# Patient Record
Sex: MALE | Race: WHITE
[De-identification: names, ages, dates, MRNs, and addresses within clinical notes are randomized per-mention and may not be internally consistent; named-entity substitution may affect disease eponyms.]

---

## 2017-01-21 ENCOUNTER — HOSPITAL ENCOUNTER (OUTPATIENT)
Dept: HOSPITAL 80 - FED | Age: 37
Setting detail: OBSERVATION
Discharge: HOME | End: 2017-01-21
Attending: INTERNAL MEDICINE | Admitting: INTERNAL MEDICINE
Payer: COMMERCIAL

## 2017-01-21 VITALS — OXYGEN SATURATION: 99 % | TEMPERATURE: 97.6 F | HEART RATE: 88 BPM

## 2017-01-21 VITALS — DIASTOLIC BLOOD PRESSURE: 99 MMHG | SYSTOLIC BLOOD PRESSURE: 130 MMHG | RESPIRATION RATE: 18 BRPM

## 2017-01-21 DIAGNOSIS — S00.572A: ICD-10-CM

## 2017-01-21 DIAGNOSIS — F31.9: ICD-10-CM
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
## 2017-01-21 DIAGNOSIS — G40.909: Primary | ICD-10-CM

## 2017-01-21 LAB
% IMMATURE GRANULYOCYTES: 0.2 % (ref 0–1.1)
ABSOLUTE IMMATURE GRANULOCYTES: 0.03 10^3/UL (ref 0–0.1)
ABSOLUTE NRBC COUNT: 0 10^3/UL (ref 0–0.01)
ADD DIFF?: NO
ADD MORPH?: NO
ADD SCAN?: NO
ANION GAP SERPL CALC-SCNC: 28 MEQ/L (ref 8–16)
ATYPICAL LYMPHOCYTE FLAG: 0 (ref 0–99)
CALCIUM SERPL-MCNC: 9.6 MG/DL (ref 8.5–10.4)
CHLORIDE SERPL-SCNC: 100 MEQ/L (ref 97–110)
CO2 SERPL-SCNC: 16 MEQ/L (ref 22–31)
CREAT SERPL-MCNC: 1.1 MG/DL (ref 0.7–1.3)
ERYTHROCYTE [DISTWIDTH] IN BLOOD BY AUTOMATED COUNT: 12.5 % (ref 11.5–15.2)
FRAGMENT RBC FLAG: 0 (ref 0–99)
GFR SERPL CREATININE-BSD FRML MDRD: > 60 ML/MIN/{1.73_M2}
GLUCOSE SERPL-MCNC: 201 MG/DL (ref 70–100)
HCT VFR BLD CALC: 42.3 % (ref 40–51)
HGB BLD-MCNC: 14.2 G/DL (ref 13.7–17.5)
LEFT SHIFT FLG: 0 (ref 0–99)
LIPEMIA HEMOLYSIS FLAG: 80 (ref 0–99)
MCH RBC BLDCO QN: 31.7 PG (ref 27.9–34.1)
MCHC RBC AUTO-ENTMCNC: 33.6 G/DL (ref 32.4–36.7)
MCV RBC AUTO: 94.4 FL (ref 81.5–99.8)
NRBC-AUTO%: 0 % (ref 0–0.2)
PLATELET # BLD: 346 10^3/UL (ref 150–400)
PLATELET CLUMPS FLAG: 10 (ref 0–99)
PMV BLD AUTO: 10.1 FL (ref 8.7–11.7)
POTASSIUM SERPL-SCNC: 3.5 MEQ/L (ref 3.5–5.2)
RBC # BLD AUTO: 4.48 10^6/UL (ref 4.4–6.38)
SODIUM SERPL-SCNC: 144 MEQ/L (ref 134–144)

## 2017-01-21 PROCEDURE — 99291 CRITICAL CARE FIRST HOUR: CPT

## 2017-01-21 PROCEDURE — 96375 TX/PRO/DX INJ NEW DRUG ADDON: CPT

## 2017-01-21 PROCEDURE — 70450 CT HEAD/BRAIN W/O DYE: CPT

## 2017-01-21 PROCEDURE — 70486 CT MAXILLOFACIAL W/O DYE: CPT

## 2017-01-21 PROCEDURE — 96374 THER/PROPH/DIAG INJ IV PUSH: CPT

## 2017-01-21 PROCEDURE — G0378 HOSPITAL OBSERVATION PER HR: HCPCS

## 2017-01-21 PROCEDURE — 93005 ELECTROCARDIOGRAM TRACING: CPT

## 2017-01-21 PROCEDURE — 96361 HYDRATE IV INFUSION ADD-ON: CPT

## 2017-01-21 NOTE — GHP
[f rep st]



                                                            HISTORY AND PHYSICAL





DATE OF ADMISSION:  01/21/2017



CHIEF COMPLAINT:  Seizure.



HISTORY OF PRESENT ILLNESS:  A 36-year-old man brought into the ED after seizure.  He is quite obtund
ed when I am seeing him.  History is obtained per chart and per discussion with Dr. Grossman.  He has 
a known seizure disorder, takes Lamictal.  He did not take his Lamictal and drank alcohol yesterday. 
 He then had a seizure.  He would return back to his baseline, per reports, while he was in the emerg
ency department.  At that time, he did not have any facial pain, head, neck or spinal pain.  Initial 
plan was to discharge him. 



He was then witnessed to have 2 additional seizures while he was in the emergency department.  After 
these seizures, he became quite combative in a postictal state.  He required 4 mg of IV Ativan to con
trol him.  He also required ketamine.  He was so combative to have CT scans performed.  When I am see
ing him, he is quite sleepy.  He will arouse to sternal rub but is not very coherent.



PAST MEDICAL HISTORY:  

1.  Bipolar.

2.  Seizure.

3.  Anxiety disorder.

4.  Vertebral fracture.



PAST SURGICAL HISTORY:  None.



SOCIAL HISTORY:  He occasionally drinks alcohol.  He initially denied drug use.



FAMILY HISTORY:  Unknown, due to patient's state.



REVIEW OF SYSTEMS:  Unobtainable, due to patient's state.



PHYSICAL EXAM:  VITAL SIGNS:  Blood pressure is 139/109, heart rate 128, respiration rate 20, saturat
ing 94% on 2 L, temperature is 36.8.  GENERAL:  The patient is an obtunded man lying in bed, with blo
od throughout his mouth.  HEENT:  Shows blood on his mouth.  Unable to fully inspect his mouth at thi
s time.  CARDIOVASCULAR:  Exam shows him to be tachycardic.  There are no murmurs rubs or gallops.  P
ULMONARY:  Exam shows him to be protecting his airway.  His lungs are clear to auscultation bilateral
ly.  ABDOMEN:  Soft in all 4 quadrants.  He is nontender.  He does not grimace with deep palpation.  
There are no masses or hepatomegaly appreciated. 

SKIN:  Exam shows no rash. 

:  Exam shows no Rodriguez. 

NEUROLOGIC:  Exam shows him to be obtunded.  He is moving all of his extremities. 

PSYCHIATRIC:  Exam was unobtainable.



LABS:  White count is 12.8, bicarbonate is 16, glucose is 201.  Lamictal level is pending.



DATA:  

1.  I discussed this with Dr. Grossman.  We will admit him to the hospital.

2.  Face CT shows no acute findings.

3.  Head CT shows no acute intracranial findings.

4.  ECG, which I personally viewed and interpreted, shows sinus tachycardia.  He has mild T-wave abno
rmalities diffusely.



IMPRESSION AND PLAN:  This is a 36-year-old man who presented with multiple seizures/status epileptic
us.

1.  Multiple seizures/status epilepticus:  Currently quite obtunded, after receiving Ativan and Keppr
a.  I think it would be safest to admit him to the ICU for close monitoring.  He was quite combative 
after seizures in the emergency department.  We will give him, for now, 500 mg of IV Keppra.  I have 
written for p.r.n. Ativan.  He may need a Precedex drip to control him, based on his response when th
e current Ativan wears off.  I have not been able to adequately look in his mouth to see if potential
 tongue or cheek lacerations; this will need to be done, when he is more alert, as he has significant
 amount of blood dried blood around his mouth.  He takes Lamictal as an outpatient.  Will need to res
tart this when appropriate.

2.  Bipolar disorder:  Taking Lamictal.

3.  Lamictal level is pending.





Job #:  [id number]

## 2017-01-21 NOTE — GDS
[f rep st]



                                                             DISCHARGE SUMMARY





ALL DIAGNOSES:  

1.  Multiple seizures. 

2.  Bipolar disorder. 

3.  Anxiety disorder.



HOSPITAL COURSE:  A 36-year-old man who was brought in after having a seizure.  He was initially conv
ersant in the Emergency Department.  He stated that he had not taken his Lamictal.  He then proceeded
 to have another seizure, became postictal and quite agitated, required IV Ativan.  When he was admit
amy he was quite obtunded.  After a few hours, he has completely cleared.  He is ambulating safely, e
ating and drinking.  His mother is with him and says that she will stay with him tonight.  I warned t
hem that it would be safer to watch him here, considering he had multiple seizures in 1 day.  However
, they understand the risks and benefits of being discharged, and I think it is reasonable to dischar
ge him in the situation.



MEDICATIONS:  He has all the prescriptions he needs.



FOLLOWUP:  He has good follow up with Neurology, as well as Psychiatry for his bipolar.





Job #:  [id number]

## 2017-01-21 NOTE — CT
CT Head Without Contrast



History: Seizure this morning and again in the Emergency Department, bleeding from mouth.



Comparison: CT facial bones same day, CT head October 28, 2016.



Technique: Axial unenhanced images were obtained from the vertex through the skull base. Dose reducti
on techniques were utilized.



Findings: Gray-white differentiation is preserved.  The ventricles and sulci are normal.  No intracra
nial hemorrhage is identified.  No extraaxial fluid collections are identified. There is no mass effe
ct or evidence of infarct.  The skull and skull base are unremarkable.  Mild mucous membrane thickeni
ng is present in the paranasal sinuses.  The mastoid air cells are clear. 



Impression: No acute intracranial findings.



Findings discussed with Dr. Mónica Gorssman today at 1453 hours.

## 2017-01-21 NOTE — EDPHY
H & P


Time Seen by Provider: 01/21/17 12:18


HPI/ROS: 


CHIEF COMPLAINT:   seizure, facial trauma





HISTORY OF PRESENT ILLNESS:  Patient is a 36-year-old male with a history of 

bipolar disorder and known seizure disorder.  The patient states he normally 

takes Lamictal.  I had the perfect storm.  The patient states he drank 

alcohol yesterday and he did not take his Lamictal.  This caused him to be 

prone to seizures.  He had seizure activity this morning.  His last seizure was 

1 year ago.  He does not recall the event.  He now feels back to his baseline.  

He is aware he is in the emergency department answers all my questions 

appropriately.  He states he does not want further testing at this time.  He 

denies significant facial pain.  He denies any headache.  He has no neck or 

spinal pain.  Denies weakness or numbness.  States his dentition is well 

aligned and he has a normal bite.





REVIEW OF SYSTEMS:  


My complete review of systems is negative except as mentioned in the HPI.


Past Medical/Surgical History: 


Includes bipolar disorder, seizure, anxiety, thoracic vertebral fracture





Past surgical history:  Negative





Social history:  The patient drank last evening.  He denies drug use.


Smoking Status: Never smoked


Physical Exam: 


Vitals noted.  The patient is tachycardic.


GENERAL:  Well-appearing, in no acute distress, alert.


HEAD:  No evidence of trauma.


EYES:  PERRLA, EOMI, normal to inspection.


Face/ENT:  Airway intact.  Patient has a small nonsuturable laceration on his 

right lateral tongue.  Dentition is intact. No malocclusion, No hemotympanum, 

normal external examination.


NECK:  The trachea is midline.  There is no crepitus.  The C-spine is 

nontender.  NEXUS criteria is negative (no midline tenderness, no distracting 

injury, no altered mental status, no recent alcohol use, no focal neurologic 

deficit).


RESPIRATORY:  Clear to auscultation bilaterally, no rales, rhonchi or wheezing.

  There is no crepitus or palpable rib fractures.


CVS:  Regular rhythm, tachycardia, no rubs, murmurs, or gallops.


ABDOMEN:  Soft, nontender, nondistended, normal bowel sounds, no bruising or 

abrasions.


Pelvis:  Stable. No tenderness palpation.  Hips full range of motion.


BACK:  Normal to inspection, no spinal tenderness, no spinal step off, no 

notable bruising or abrasions.


SKIN:  Normal color, warm, dry.  No pallor or diaphoresis.


EXTREMITIES:  


Right upper extremity:  Atraumatic.  No visible signs of trauma. No tenderness 

palpation. Neurovascular intact distally.


Left upper extremity:  Patient has some abrasions to his left hand.  He has no 

bony tenderness palpation with full range of motion. Otherwise no visible 

trauma. Neurovascular intact distally.


Right lower extremity:  Atraumatic.  No visible signs of trauma. No tenderness 

palpation. Neurovascular intact distally.


Left lower extremity:  Atraumatic.  No visible signs of trauma. No tenderness 

palpation. Neurovascular intact distally.


Atraumatic, neurovascularly intact distally in all extremities, pelvis is stable

, hips with full range of motion, moves all extremities freely.


NEURO/PSYCH:  


Higher functions:  Alert and Oriented x3.  Normal speech and cognition.  Normal 

mood and affect.


Cranial nerves:  Normal as tested.


Cerebellar:  Normal as tested.  Good finger to nose, good heel-to-shin, normal 

gait.


Peripheral exam:  Normal motor exam.  Normal sensation.  


Constitutional: 


 Initial Vital Signs











Temperature (C)  36.8 C   01/21/17 12:51


 


Heart Rate  128 H  01/21/17 12:51


 


Respiratory Rate  20   01/21/17 12:51


 


Blood Pressure  139/109 H  01/21/17 12:51


 


O2 Sat (%)  94   01/21/17 12:51








 











O2 Delivery Mode               Nasal Cannula


 


O2 (L/minute)                  2














Allergies/Adverse Reactions: 


 





No Known Allergies Allergy (Unverified 10/20/16 03:12)


 








Home Medications: 














 Medication  Instructions  Recorded


 


Hydrocodone/APAP 5/325 [Norco 1 - 2 tab PO Q4H PRN #10 tab 10/20/16





5/325]  


 


Ibuprofen [Motrin (*)] 800 mg PO Q6-8PRN #30 tab 10/20/16


 


LAMOTRIGINE  10/20/16


 


Seroquel  10/20/16














Medical Decision Making


ED Course/Re-evaluation: 


I discussed possible etiologies with the patient.  He does not want further 

testing at this time.  I discussed the benefit of imaging of the head and face 

for possible trauma and as a source of his seizure.  He states he has a known 

seizure disorder that comes about when he drinks and does not take his 

Lamictal.  He does not want further imaging or blood tests.  The patient was 

given normal saline 1 L IV for hydration due to his tachycardia.  Patient 

consented.





155:  I was walking by the patient room.  He appeared to have the beginning of 

seizure-like activity.  I went to his bedside.  Suction and non-rebreather was 

placed.  It I maintained his airway during his seizure activity.  Nursing staff 

was notified.  The patient was given Ativan 1 mg IV.  The patient's seizure 

arrested.  He did become tachycardic and hypoxic during his seizure activity.  

Once seizure activity was resolved with the non-rebreather in place his oxygen 

saturation was 99% on room air.





Due to the patient's repeat seizure activity laboratory studies and CT scan was 

ordered.





I was called back to the patient's bedside due to combative behavior.  Nursing 

staff states that given him Ativan 3 mg additionally prior to my arrival.  The 

patient was requiring significant restraint.  I ordered ketamine 1 milligram/

kilogram IV.





I stayed with the patient and rechecked the bedside.  Ketamine appeared to work 

well and he had associated.  Delayed comfortably in the bed.  He was taken to 

CT imaging without complication.





Upon return to the room the patient was stable.  He had no further seizure 

activity.  Patient was able to open his eyes to loud voice.  He moved all 

extremities with no signs of focal deficit.  He responded to an IV placement in 

his right upper extremity.





I discussed case with the hospitalist service for admission. (Dr. Vaughn)





1455:  CT of the head and face.  No acute disease noted. Please refer the 

dictated report by Dr. Shook.





1500:  The patient is stable. No new complaints.  I discussed the plan with the 

patient's mother.  Discussed CT results.





1545:  The patient is stable. He is still sleepy but arouses to voice.  Moves 

all extremities. No seizure-like activity.


Differential Diagnosis: 


My differential includes but is not limited to seizure disorder, seizure 

activity, facial trauma, dental trauma, electrolyte abnormality, sugar 

abnormality, dehydration, subarachnoid hemorrhage, subdural hematoma, epidural 

hematoma, spinal injury


Critical Care Time: 


Patient required 40 minutes of critical care time.  This is exclusive of any 

unbundled procedure.  This was due to the patient's seizure activity in the 

emergency department, extensive time spent at the patient's bedside, multiple 

doses of medication including ketamine, and frequent rechecks.





- Data Points


Laboratory Results: 


 Laboratory Results





 01/21/17 12:25 





 01/21/17 12:25 





 











  01/21/17





  12:25


 


WBC  12.18 H 10^3/uL





   (3.80-9.50) 


 


RBC  4.48 10^6/uL





   (4.40-6.38) 


 


Hgb  14.2 g/dL





   (13.7-17.5) 


 


Hct  42.3 %





   (40.0-51.0) 


 


MCV  94.4 fL





   (81.5-99.8) 


 


MCH  31.7 pg





   (27.9-34.1) 


 


MCHC  33.6 g/dL





   (32.4-36.7) 


 


RDW  12.5 %





   (11.5-15.2) 


 


Plt Count  346 10^3/uL





   (150-400) 


 


MPV  10.1 fL





   (8.7-11.7) 


 


Neut % (Auto)  44.1 %





   (39.3-74.2) 


 


Lymph % (Auto)  46.9 H %





   (15.0-45.0) 


 


Mono % (Auto)  6.8 %





   (4.5-13.0) 


 


Eos % (Auto)  1.1 %





   (0.6-7.6) 


 


Baso % (Auto)  0.9 %





   (0.3-1.7) 


 


Nucleat RBC Rel Count  0.0 %





   (0.0-0.2) 


 


Absolute Neuts (auto)  5.36 10^3/uL





   (1.70-6.50) 


 


Absolute Lymphs (auto)  5.71 H 10^3/uL





   (1.00-3.00) 


 


Absolute Monos (auto)  0.83 H 10^3/uL





   (0.30-0.80) 


 


Absolute Eos (auto)  0.14 10^3/uL





   (0.03-0.40) 


 


Absolute Basos (auto)  0.11 H 10^3/uL





   (0.02-0.10) 


 


Absolute Nucleated RBC  0.00 10^3/uL





   (0-0.01) 


 


Immature Gran %  0.2 %





   (0.0-1.1) 


 


Immature Gran #  0.03 10^3/uL





   (0.00-0.10) 


 


Sodium  144 mEq/L





   (134-144) 


 


Potassium  3.5 mEq/L





   (3.5-5.2) 


 


Chloride  100 mEq/L





   () 


 


Carbon Dioxide  16 L mEq/l





   (22-31) 


 


Anion Gap  28 mEq/L





   (8-16) 


 


BUN  9 mg/dL





   (7-23) 


 


Creatinine  1.1 mg/dL





   (0.7-1.3) 


 


Estimated GFR  > 60 





  


 


Glucose  201 H mg/dL





   () 


 


Calcium  9.6 mg/dL





   (8.5-10.4) 


 


Lamotrigine  Pending 





  











Medications Given: 


 








Discontinued Medications





Sodium Chloride (Ns)  1,000 mls @ 0 mls/hr IV ONCE ONE


   PRN Reason: Wide Open


   Stop: 01/21/17 13:39


   Last Admin: 01/21/17 13:40 Dose:  1,000 mls


Ketamine HCl (Ketamine)  70 mg IVP EDNOW ONE


   Stop: 01/21/17 14:55


   Last Admin: 01/21/17 14:20 Dose:  70 mg


Lorazepam (Ativan Injection)  1 mg IVP EDNOW ONE


   Stop: 01/21/17 14:01


   Last Admin: 01/21/17 14:00 Dose:  1 mg


Lorazepam (Ativan Injection)  2 mg IVP EDNOW ONE


   Stop: 01/21/17 14:25


   Last Admin: 01/21/17 14:10 Dose:  2 mg


Lorazepam (Ativan Injection)  1 mg IVP EDNOW ONE


   Stop: 01/21/17 14:26


   Last Admin: 01/21/17 14:15 Dose:  1 mg








Departure





- Departure


Disposition: Home, Routine, Self-Care


Clinical Impression: 


 Seizure, Facial contusion


Condition: Good

## 2017-01-21 NOTE — CT
CT Facial Bones (Without Contrast)



History: Seizure. 



Comparison:  CT head same day.



Technique:   Axial images were obtained through the facial bones and coronal reformations were perfor
med.  The data was reformatted in bone algorithm. Dose reduction techniques were utilized.



Findings:  No fracture is identified. Mild mucous membrane thickening is present in the paranasal sin
uses with no air-fluid levels. The mastoid air cells are clear. The visible brain and orbits are norm
al. The temporomandibular joints are aligned.



Impression: No acute findings.



Findings discussed with Dr. Mónica Grossman today at 1453 hours.

## 2017-01-21 NOTE — CPEKG
Heart Rate: 122

RR Interval: 492

P-R Interval: 160

QRSD Interval: 96

QT Interval: 296

QTC Interval: 422

P Axis: 25

QRS Axis: 10

T Wave Axis: -44

EKG Severity - BORDERLINE ECG -

EKG Impression: SINUS TACHYCARDIA

EKG Impression: BORDERLINE T ABNORMALITIES, DIFFUSE LEADS

Electronically Signed By: Mónica Grossman 21-Jan-2017 21:51:27

## 2017-01-27 ENCOUNTER — HOSPITAL ENCOUNTER (OUTPATIENT)
Dept: HOSPITAL 80 - BMCIMAGING | Age: 37
End: 2017-01-27
Attending: INTERNAL MEDICINE
Payer: COMMERCIAL

## 2017-01-27 DIAGNOSIS — S63.682A: Primary | ICD-10-CM

## 2017-01-27 NOTE — DX
Left Hand, Three Views



History: Pain post trauma. Thumb pain. Injured hand on Saturday.



Comparison: None



Findings: There are 3 small avulsion fragments at the lateral base of the proximal phalanx of the thu
mb. 2 of the 3 fragments are slightly distracted.



Impression: Ulnar collateral ligament injury at the first metacarpal phalangeal joint; suspect chroni
c superimposed upon acute. Correlation with the patient's history is recommended. If there is concern
 for a Stener Lesion, then consider MRI.

## 2017-02-07 ENCOUNTER — HOSPITAL ENCOUNTER (EMERGENCY)
Dept: HOSPITAL 80 - FED | Age: 37
Discharge: HOME | End: 2017-02-07
Payer: COMMERCIAL

## 2017-02-07 VITALS
RESPIRATION RATE: 16 BRPM | OXYGEN SATURATION: 96 % | SYSTOLIC BLOOD PRESSURE: 133 MMHG | DIASTOLIC BLOOD PRESSURE: 101 MMHG | HEART RATE: 69 BPM

## 2017-02-07 VITALS — TEMPERATURE: 97.3 F

## 2017-02-07 DIAGNOSIS — G40.909: Primary | ICD-10-CM

## 2017-02-07 LAB
% IMMATURE GRANULYOCYTES: 0.3 % (ref 0–1.1)
ABSOLUTE IMMATURE GRANULOCYTES: 0.03 10^3/UL (ref 0–0.1)
ABSOLUTE NRBC COUNT: 0 10^3/UL (ref 0–0.01)
ADD DIFF?: NO
ADD MORPH?: NO
ADD SCAN?: NO
ANION GAP SERPL CALC-SCNC: 15 MEQ/L (ref 8–16)
ATYPICAL LYMPHOCYTE FLAG: 10 (ref 0–99)
CALCIUM SERPL-MCNC: 10 MG/DL (ref 8.5–10.4)
CHLORIDE SERPL-SCNC: 105 MEQ/L (ref 97–110)
CO2 SERPL-SCNC: 22 MEQ/L (ref 22–31)
CREAT SERPL-MCNC: 1 MG/DL (ref 0.7–1.3)
ERYTHROCYTE [DISTWIDTH] IN BLOOD BY AUTOMATED COUNT: 12.1 % (ref 11.5–15.2)
FRAGMENT RBC FLAG: 0 (ref 0–99)
GFR SERPL CREATININE-BSD FRML MDRD: > 60 ML/MIN/{1.73_M2}
GLUCOSE SERPL-MCNC: 89 MG/DL (ref 70–100)
HCT VFR BLD CALC: 42.9 % (ref 40–51)
HGB BLD-MCNC: 14.9 G/DL (ref 13.7–17.5)
LEFT SHIFT FLG: 0 (ref 0–99)
LIPEMIA HEMOLYSIS FLAG: 90 (ref 0–99)
MCH RBC BLDCO QN: 31.6 PG (ref 27.9–34.1)
MCHC RBC AUTO-ENTMCNC: 34.7 G/DL (ref 32.4–36.7)
MCV RBC AUTO: 91.1 FL (ref 81.5–99.8)
NRBC-AUTO%: 0 % (ref 0–0.2)
PLATELET # BLD: 305 10^3/UL (ref 150–400)
PLATELET CLUMPS FLAG: 0 (ref 0–99)
PMV BLD AUTO: 9.4 FL (ref 8.7–11.7)
POTASSIUM SERPL-SCNC: 4.4 MEQ/L (ref 3.5–5.2)
RBC # BLD AUTO: 4.71 10^6/UL (ref 4.4–6.38)
SODIUM SERPL-SCNC: 142 MEQ/L (ref 134–144)

## 2017-02-07 NOTE — EDPHY
H & P


Time Seen by Provider: 02/07/17 15:15


HPI/ROS: 


CHIEF COMPLAINT:  Seizure disorder





HISTORY OF PRESENT ILLNESS:  37-year-old male presents to the emergency 

department with known seizure disorder.  The patient had his 1st grand mal 

seizure in December of 2015. He takes Lamictal 250 mg twice daily which she has 

been compliant with taking.  He tells me that today and over last few days he 

is concerned that he I am going to have a seizure.  He feels like he has an 

aura.  He thinks that he last had a seizure a few days ago.  He does not do 

any recreational drugs.  He does drink alcohol on occasion now although he has 

never had an alcohol withdrawal seizure.  He denies any reported trauma.  He 

denies chest pain or difficulty breathing.  Denies headache. Denies fevers or 

chills.  Denies URI symptoms.





REVIEW OF SYSTEMS:


Constitutional:  No fever, no chills.


Eyes:  No double or blurry vision.


ENT:  No sore throat.


Respiratory:  No cough, no shortness of breath.


Cardiac:  No chest pain.


Gastrointestinal:  No abdominal pain, vomiting or diarrhea.


Genitourinary:  No dysuria.


Musculoskeletal:  No neck or back pain.


Skin:  No rashes.


Neurological:  No headache.


Past Medical/Surgical History: 


Seizure disorder, bipolar


Social History: 


Professor


Smoking Status: Never smoked


Physical Exam: 


General Appearance:  Alert, no distress. Mentating normally and answering 

questions appropriately.


Eyes:  Pupils equal and round.  Extraocular motions are all intact.


ENT:  Mouth:  Mucous membranes moist.


Respiratory:  No wheezing, rhonchi, or rales, lungs are clear to auscultation.


Cardiovascular:  Regular rate and rhythm.


Gastrointestinal:  Abdomen is soft and nontender, no masses, no rebound or 

guarding, bowel sounds normal.


Neurological:  Alert and oriented x 3, cranial nerves II through XII grossly 

intact


Skin:  Warm and dry, no rashes.


Musculoskeletal:  Nontender to palpate along the cervical, thoracic or lumbar 

spine.  Neck is supple.


Extremities:  Full range of motion and no peripheral edema.


Psychiatric:  Patient is oriented X 3, there is no agitation.


Constitutional: 


 Initial Vital Signs











Temperature (C)  36.3 C   02/07/17 15:01


 


Heart Rate  92   02/07/17 15:01


 


Respiratory Rate  18   02/07/17 15:01


 


Blood Pressure  152/95 H  02/07/17 15:01


 


O2 Sat (%)  94   02/07/17 15:01








 











O2 Delivery Mode               Room Air














Allergies/Adverse Reactions: 


 





No Known Allergies Allergy (Verified 02/07/17 14:59)


 








Home Medications: 














 Medication  Instructions  Recorded


 


Amitriptyline HCl [Elavil 10 mg 10 mg PO HS 01/21/17





(*)]  


 


Finasteride [Propecia] 1 mg PO DAILY 01/21/17


 


Ibuprofen [Motrin (*)] 400 - 600 mg PO DAILY PRN 01/21/17


 


QUEtiapine FUMARATE [Seroquel 25 25 mg PO BID 01/21/17





mg (*)]  


 


QUEtiapine FUMARATE [Seroquel 300 mg PO HS 01/21/17





300mg (*)]  


 


lamOTRIGine [Lamictal] 200 mg PO BID 01/21/17














Medical Decision Making


ED Course/Re-evaluation: 


37-year-old male presents to the emergency department concerned that he may 

have a seizure.  Laboratory studies including CO2 are all normal.  The patient 

was given a mg of Ativan IV in the emergency department.  He was observed for 

nearly 2 hours in the emergency department without any seizure activity.  Was 

comfortable being discharged home.  Encouraged close follow-up with his 

neurologist.





The case was discussed with Dr. Wisam Bailey, secondary supervising physician, 

who did not directly evaluate the patient but agrees with treatment and plan.


Differential Diagnosis: 


Seizure including but not limited to electrolyte abnormality, alcohol withdrawal

, medication noncompliance, head injury, and breakthrough seizure.





- Data Points


Laboratory Results: 


 Laboratory Results





 02/07/17 15:30 





 02/07/17 15:30 





 











  02/07/17





  15:30


 


WBC  8.69 10^3/uL





   (3.80-9.50) 


 


RBC  4.71 10^6/uL





   (4.40-6.38) 


 


Hgb  14.9 g/dL





   (13.7-17.5) 


 


Hct  42.9 %





   (40.0-51.0) 


 


MCV  91.1 fL





   (81.5-99.8) 


 


MCH  31.6 pg





   (27.9-34.1) 


 


MCHC  34.7 g/dL





   (32.4-36.7) 


 


RDW  12.1 %





   (11.5-15.2) 


 


Plt Count  305 10^3/uL





   (150-400) 


 


MPV  9.4 fL





   (8.7-11.7) 


 


Neut % (Auto)  46.1 %





   (39.3-74.2) 


 


Lymph % (Auto)  42.2 %





   (15.0-45.0) 


 


Mono % (Auto)  7.9 %





   (4.5-13.0) 


 


Eos % (Auto)  2.2 %





   (0.6-7.6) 


 


Baso % (Auto)  1.3 %





   (0.3-1.7) 


 


Nucleat RBC Rel Count  0.0 %





   (0.0-0.2) 


 


Absolute Neuts (auto)  4.00 10^3/uL





   (1.70-6.50) 


 


Absolute Lymphs (auto)  3.67 H 10^3/uL





   (1.00-3.00) 


 


Absolute Monos (auto)  0.69 10^3/uL





   (0.30-0.80) 


 


Absolute Eos (auto)  0.19 10^3/uL





   (0.03-0.40) 


 


Absolute Basos (auto)  0.11 H 10^3/uL





   (0.02-0.10) 


 


Absolute Nucleated RBC  0.00 10^3/uL





   (0-0.01) 


 


Immature Gran %  0.3 %





   (0.0-1.1) 


 


Immature Gran #  0.03 10^3/uL





   (0.00-0.10) 


 


Sodium  142 mEq/L





   (134-144) 


 


Potassium  4.4 mEq/L





   (3.5-5.2) 


 


Chloride  105 mEq/L





   () 


 


Carbon Dioxide  22 mEq/l





   (22-31) 


 


Anion Gap  15 mEq/L





   (8-16) 


 


BUN  11 mg/dL





   (7-23) 


 


Creatinine  1.0 mg/dL





   (0.7-1.3) 


 


Estimated GFR  > 60 





  


 


Glucose  89 mg/dL





   () 


 


Calcium  10.0 mg/dL





   (8.5-10.4) 











Medications Given: 


 








Discontinued Medications





Lorazepam (Ativan Injection)  1 mg IVP EDNOW ONE


   Stop: 02/07/17 15:35


   Last Admin: 02/07/17 15:38 Dose:  1 mg








Departure





- Departure


Disposition: Home, Routine, Self-Care


Clinical Impression: 


 Seizure disorder


Condition: Good


Instructions:  Epilepsy (ED)


Additional Instructions: 


All of your laboratory studies are within normal limits.  You should follow up 

with your primary care provider and neurologist this week.  Return to the 

emergency department if you have any recurring seizure activity or any other 

concerns.


Referrals: 


Warren Morgan [Medical Doctor] - As per Instructions


Tio Johns MD [Medical Doctor] - As per Instructions (Neurologist on-call

)

## 2017-02-09 ENCOUNTER — HOSPITAL ENCOUNTER (EMERGENCY)
Dept: HOSPITAL 80 - FED | Age: 37
Discharge: HOME | End: 2017-02-09
Payer: COMMERCIAL

## 2017-02-09 VITALS — DIASTOLIC BLOOD PRESSURE: 66 MMHG | HEART RATE: 66 BPM | SYSTOLIC BLOOD PRESSURE: 133 MMHG | OXYGEN SATURATION: 96 %

## 2017-02-09 VITALS — RESPIRATION RATE: 16 BRPM

## 2017-02-09 VITALS — TEMPERATURE: 97.9 F

## 2017-02-09 DIAGNOSIS — F31.11: Primary | ICD-10-CM

## 2017-02-09 LAB
% IMMATURE GRANULYOCYTES: 0.5 % (ref 0–1.1)
ABSOLUTE IMMATURE GRANULOCYTES: 0.05 10^3/UL (ref 0–0.1)
ABSOLUTE NRBC COUNT: 0 10^3/UL (ref 0–0.01)
ADD DIFF?: NO
ADD MORPH?: NO
ADD SCAN?: NO
ANION GAP SERPL CALC-SCNC: 21 MEQ/L (ref 8–16)
ATYPICAL LYMPHOCYTE FLAG: 0 (ref 0–99)
CALCIUM SERPL-MCNC: 9.7 MG/DL (ref 8.5–10.4)
CHLORIDE SERPL-SCNC: 107 MEQ/L (ref 97–110)
CO2 SERPL-SCNC: 21 MEQ/L (ref 22–31)
CREAT SERPL-MCNC: 1 MG/DL (ref 0.7–1.3)
ERYTHROCYTE [DISTWIDTH] IN BLOOD BY AUTOMATED COUNT: 12.1 % (ref 11.5–15.2)
ETHANOL SERPL-MCNC: 217 MG/DL (ref 0–10)
FRAGMENT RBC FLAG: 0 (ref 0–99)
GFR SERPL CREATININE-BSD FRML MDRD: > 60 ML/MIN/{1.73_M2}
GLUCOSE SERPL-MCNC: 95 MG/DL (ref 70–100)
HCT VFR BLD CALC: 46.3 % (ref 40–51)
HGB BLD-MCNC: 16.2 G/DL (ref 13.7–17.5)
LEFT SHIFT FLG: 0 (ref 0–99)
LIPEMIA HEMOLYSIS FLAG: 90 (ref 0–99)
MCH RBC BLDCO QN: 31.3 PG (ref 27.9–34.1)
MCHC RBC AUTO-ENTMCNC: 35 G/DL (ref 32.4–36.7)
MCV RBC AUTO: 89.6 FL (ref 81.5–99.8)
NRBC-AUTO%: 0 % (ref 0–0.2)
PLATELET # BLD: 331 10^3/UL (ref 150–400)
PLATELET CLUMPS FLAG: 0 (ref 0–99)
PMV BLD AUTO: 9.5 FL (ref 8.7–11.7)
POTASSIUM SERPL-SCNC: 4.8 MEQ/L (ref 3.5–5.2)
PROLACTIN SERPL-MCNC: 13.9 NG/ML (ref 3.7–17.9)
RBC # BLD AUTO: 5.17 10^6/UL (ref 4.4–6.38)
SALICYLATES SERPL-MCNC: < 1 MG/DL (ref 2–20)
SODIUM SERPL-SCNC: 149 MEQ/L (ref 134–144)

## 2017-02-09 PROCEDURE — G0480 DRUG TEST DEF 1-7 CLASSES: HCPCS
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
## 2017-07-07 ENCOUNTER — HOSPITAL ENCOUNTER (OUTPATIENT)
Dept: HOSPITAL 80 - FED | Age: 37
Setting detail: OBSERVATION
LOS: 1 days | Discharge: PSYCHIATRIC OR SUBSTANCE USE FACILITY | End: 2017-07-08
Attending: INTERNAL MEDICINE | Admitting: INTERNAL MEDICINE
Payer: SELF-PAY

## 2017-07-07 DIAGNOSIS — R40.0: ICD-10-CM

## 2017-07-07 DIAGNOSIS — R00.0: ICD-10-CM

## 2017-07-07 DIAGNOSIS — G40.909: ICD-10-CM

## 2017-07-07 DIAGNOSIS — F12.10: ICD-10-CM

## 2017-07-07 DIAGNOSIS — F31.5: Primary | ICD-10-CM

## 2017-07-07 DIAGNOSIS — R73.9: ICD-10-CM

## 2017-07-07 DIAGNOSIS — E87.0: ICD-10-CM

## 2017-07-07 DIAGNOSIS — G93.40: ICD-10-CM

## 2017-07-07 LAB
% IMMATURE GRANULYOCYTES: 0.4 % (ref 0–1.1)
ABSOLUTE IMMATURE GRANULOCYTES: 0.04 10^3/UL (ref 0–0.1)
ABSOLUTE NRBC COUNT: 0 10^3/UL (ref 0–0.01)
ADD DIFF?: NO
ADD MORPH?: NO
ADD SCAN?: NO
ANION GAP SERPL CALC-SCNC: 15 MEQ/L (ref 8–16)
ATYPICAL LYMPHOCYTE FLAG: 0 (ref 0–99)
CALCIUM SERPL-MCNC: 9.6 MG/DL (ref 8.5–10.4)
CHLORIDE SERPL-SCNC: 106 MEQ/L (ref 97–110)
CO2 SERPL-SCNC: 19 MEQ/L (ref 22–31)
CREAT SERPL-MCNC: 0.9 MG/DL (ref 0.7–1.3)
ERYTHROCYTE [DISTWIDTH] IN BLOOD BY AUTOMATED COUNT: 11.9 % (ref 11.5–15.2)
ETHANOL SERPL-MCNC: < 10 MG/DL (ref 0–10)
FRAGMENT RBC FLAG: 0 (ref 0–99)
GFR SERPL CREATININE-BSD FRML MDRD: > 60 ML/MIN/{1.73_M2}
GLUCOSE SERPL-MCNC: 209 MG/DL (ref 70–100)
HCT VFR BLD CALC: 40.6 % (ref 40–51)
HGB BLD-MCNC: 14 G/DL (ref 13.7–17.5)
LEFT SHIFT FLG: 0 (ref 0–99)
LIPEMIA HEMOLYSIS FLAG: 90 (ref 0–99)
MCH RBC BLDCO QN: 30.9 PG (ref 27.9–34.1)
MCHC RBC AUTO-ENTMCNC: 34.5 G/DL (ref 32.4–36.7)
MCV RBC AUTO: 89.6 FL (ref 81.5–99.8)
NRBC-AUTO%: 0 % (ref 0–0.2)
PLATELET # BLD: 267 10^3/UL (ref 150–400)
PLATELET CLUMPS FLAG: 0 (ref 0–99)
PMV BLD AUTO: 11.2 FL (ref 8.7–11.7)
POTASSIUM SERPL-SCNC: 3.6 MEQ/L (ref 3.5–5.2)
RBC # BLD AUTO: 4.53 10^6/UL (ref 4.4–6.38)
SALICYLATES SERPL-MCNC: < 1 MG/DL (ref 2–20)
SODIUM SERPL-SCNC: 140 MEQ/L (ref 134–144)

## 2017-07-07 PROCEDURE — G0378 HOSPITAL OBSERVATION PER HR: HCPCS

## 2017-07-07 PROCEDURE — G0480 DRUG TEST DEF 1-7 CLASSES: HCPCS

## 2017-07-08 ENCOUNTER — HOSPITAL ENCOUNTER (INPATIENT)
Dept: HOSPITAL 80 - BBEH | Age: 37
LOS: 9 days | Discharge: HOME | DRG: 885 | End: 2017-07-17
Attending: SPECIALIST | Admitting: PSYCHIATRY & NEUROLOGY
Payer: MEDICAID

## 2017-07-08 VITALS
TEMPERATURE: 97.9 F | DIASTOLIC BLOOD PRESSURE: 72 MMHG | OXYGEN SATURATION: 98 % | SYSTOLIC BLOOD PRESSURE: 109 MMHG | HEART RATE: 103 BPM

## 2017-07-08 VITALS — RESPIRATION RATE: 16 BRPM

## 2017-07-08 DIAGNOSIS — F31.2: Primary | ICD-10-CM

## 2017-07-08 DIAGNOSIS — F12.90: ICD-10-CM

## 2017-07-08 DIAGNOSIS — Z72.89: ICD-10-CM

## 2017-07-08 LAB
% IMMATURE GRANULYOCYTES: 0.4 % (ref 0–1.1)
ABSOLUTE IMMATURE GRANULOCYTES: 0.05 10^3/UL (ref 0–0.1)
ABSOLUTE NRBC COUNT: 0 10^3/UL (ref 0–0.01)
ADD DIFF?: NO
ADD MORPH?: NO
ADD SCAN?: NO
ANION GAP SERPL CALC-SCNC: 11 MEQ/L (ref 8–16)
ATYPICAL LYMPHOCYTE FLAG: 0 (ref 0–99)
CALCIUM SERPL-MCNC: 9.2 MG/DL (ref 8.5–10.4)
CHLORIDE SERPL-SCNC: 113 MEQ/L (ref 97–110)
CO2 SERPL-SCNC: 22 MEQ/L (ref 22–31)
COLOR UR: (no result)
CREAT SERPL-MCNC: 0.8 MG/DL (ref 0.7–1.3)
ERYTHROCYTE [DISTWIDTH] IN BLOOD BY AUTOMATED COUNT: 12.2 % (ref 11.5–15.2)
FRAGMENT RBC FLAG: 0 (ref 0–99)
GFR SERPL CREATININE-BSD FRML MDRD: > 60 ML/MIN/{1.73_M2}
GLUCOSE SERPL-MCNC: 83 MG/DL (ref 70–100)
HCT VFR BLD CALC: 38 % (ref 40–51)
HGB BLD-MCNC: 12.7 G/DL (ref 13.7–17.5)
LEFT SHIFT FLG: 0 (ref 0–99)
LIPEMIA HEMOLYSIS FLAG: 80 (ref 0–99)
MAGNESIUM SERPL-MCNC: 2 MG/DL (ref 1.6–2.3)
MCH RBC BLDCO QN: 30.5 PG (ref 27.9–34.1)
MCHC RBC AUTO-ENTMCNC: 33.4 G/DL (ref 32.4–36.7)
MCV RBC AUTO: 91.1 FL (ref 81.5–99.8)
NITRITE UR QL STRIP: NEGATIVE
NRBC-AUTO%: 0 % (ref 0–0.2)
PH UR STRIP: 6 [PH] (ref 5–7.5)
PLATELET # BLD: 238 10^3/UL (ref 150–400)
PLATELET CLUMPS FLAG: 0 (ref 0–99)
PMV BLD AUTO: 11.2 FL (ref 8.7–11.7)
POTASSIUM SERPL-SCNC: 4 MEQ/L (ref 3.5–5.2)
RBC # BLD AUTO: 4.17 10^6/UL (ref 4.4–6.38)
RBC #/AREA URNS HPF: (no result) /HPF (ref 0–3)
SODIUM SERPL-SCNC: 146 MEQ/L (ref 134–144)
SP GR UR STRIP: 1 (ref 1–1.03)
WBC #/AREA URNS HPF: (no result) /HPF (ref 0–3)

## 2017-07-08 RX ADMIN — AMITRIPTYLINE HYDROCHLORIDE SCH MG: 10 TABLET, FILM COATED ORAL at 22:39
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
## 2017-07-08 NOTE — HOSPPROG
Hospitalist Progress Note


Assessment/Plan: 





ADDENDUM TO NOTE BELOW:


I have reassessed  the patient this afternoon.  He is now quite alert and 

conversant.  He is however fairly manic, with pressured speech unable to keep 

to a topic during most sentences, delusional, paranoid, disorganized in 

coversation.  I do not think he is presently competent to take care of himself 

due to this.  He seems to me gravely disabled by his bipolar disease.  He has 

resolved his presenting changes in alertness.   He is able to walk, and is 

eating and drinking.  I have asked for mental health evaluation.


--------------------------------------------








DIAGNOSES: 


-acute encephalopathy, suspect partly due to marijuana exposure and possibly 

other ingestions


-mild hypernatremia


-diabetes mellitus, reasonable insulin control here so far


-underlying mental health disorder





PLANS:


-continue supportive care expectant management


-will need to reassess history once he is able to provide it be more effectively





Seen on in interdisciplinary rounds


Reviewed with Dr. Matute





SUBJECTIVE:


Patient unable to give any information about symptoms or history at this time





OBJECTIVE


Vitals reviewed:  Stable without fever


Cardiac Monitor, my review:  Sinus rhythm





Exam:


Extremely somnolent, arousable to open his eyes but immediately falls back to 

sleep without me any real interaction


No sign of flaccid muscle groups and he does occasionally move in the bed with 

purposeful seeming movements


skin warm dry color ok


resps not labored


lungs clear BSs


heart regular


abd soft nondistended, bowel sounds present


limbs warm, no edema


Joints normal


iv site ok





Laboratory data:


Drug tox screen has marijuana but no other drugs identified


Sodium minimally elevated this morning at 146 but labs otherwise in good range











Objective: 


 Vital Signs











Temp Pulse Resp BP Pulse Ox


 


 36.4 C   61   12   104/57 L  96 


 


 07/08/17 04:00  07/08/17 08:00  07/08/17 08:00  07/08/17 08:00  07/08/17 08:00








 Laboratory Results





 07/08/17 04:03 





 07/08/17 04:03 





 











 07/07/17 07/08/17 07/09/17





 06:59 06:59 06:59


 


Intake Total  1188 


 


Output Total  250 


 


Balance  938 














- Time Spent With Patient


Time Spent with Patient: greater than 35 minutes


Time Spent with Patient: Greater than 35 minutes spent on this patients care, 

greater than 50% of time spent counseling, educating, and coordinating care 

regarding the above mentioned plan.





ICD10 Worksheet


Patient Problems: 


 Problems











Problem Status Onset


 


Altered mental status Acute  


 


Facial contusion Acute  


 


Seizure Acute

## 2017-07-09 RX ADMIN — AMITRIPTYLINE HYDROCHLORIDE SCH MG: 10 TABLET, FILM COATED ORAL at 21:17

## 2017-07-09 NOTE — PDDCSUM
Discharge Summary


Discharge Summary: 





DIAGNOSES:


-acute valentina, hx of bipolar disorder


-acute encephalopathy


-marijuana ingestion





HOSPITAL COURSE:


This patient with bipolar disorder used marijuana and became severely manic, 

though may have been so before hand.  He came in to ER and was treated with 

benzodiazepine which caused decrease in consciousness.  He was observed 

overnight and recovered well but to a very manic state.  He was felt to be 

gravely disabled and was  transferred to behavioral health unit for further 

evaluation and care.





COMPLICATIONS: 


none





DISPOSITION:


transfer to [facility]

## 2017-07-10 LAB
EST. AVERAGE GLUCOSE BLD GHB EST-MCNC: 100 MG/DL (ref 68–126)
HBA1C MFR BLD: 5.1 % (ref 4–6)

## 2017-07-10 RX ADMIN — AMITRIPTYLINE HYDROCHLORIDE SCH MG: 10 TABLET, FILM COATED ORAL at 20:19
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
## 2017-07-11 RX ADMIN — AMITRIPTYLINE HYDROCHLORIDE SCH MG: 10 TABLET, FILM COATED ORAL at 19:29

## 2017-07-11 NOTE — SOAPPROG
SOAP Progress Note


Assessment/Plan: 


Assessment:


























Plan:





07/11/17 08:00


DAY 3/ 30'


UPDATE/EXAM: Nursing reports pt is descriptively improving with less elevation, 

improving attention and concentration, generally less manic residual, less 

guarded/ on direct exam pt is calm,conversant, organized; recalls disclosure in 

pm speaker meeting with mother and myself yesterday - affirming syndromal 

history for mood swings and ? psychotic vulnerability emerging 5 + years ago in 

parallel with binge drinking pattern.  He again affirms  initial psychiatric 

attention began 3 yrs ago including several inpt intervention for mixed manic/

alcoholic crises; he also affirms his poor compliance with community treatment 

over these previous years and continuing to the present time which he firmly 

states will not continue post DC from this inpt intervention; goes on to 

disclose odd and disturbing family history including his mother spending 15 yr 

in custodial as an accomplice to murder; is agreeable to further intake on 

personal and family history in followup;  also apparently has worked for the 

[employer] which lends reality basis feeding into delusional thought pattern about [facility] 

tracking him PTA.





ASSESSMENT/PLAN: improving course as reported by Nursing and observed directly 

on direct exam/ no current change in medications or management plan as d/w 

Nursing in Rounds; workup and DC planning in process





Objective: 





 Vital Signs











Temp Pulse Resp BP Pulse Ox


 


 36.2 C   72   16   119/78   97 


 


 07/11/17 06:00  07/11/17 06:00  07/11/17 06:00  07/11/17 06:00  07/11/17 06:00














ICD10 Worksheet


Patient Problems: 


 Problems











Problem Status Onset


 


Seizure Acute  


 


Facial contusion Acute  


 


Altered mental status Acute

## 2017-07-11 NOTE — SOAPPROG
SOAP Progress Note


Assessment/Plan: 


Assessment:


























Plan:





07/11/17 08:00


DAY 3/ 30'


UPDATE/EXAM: Nursing reports pt is descriptively improving with less elevation, 

improving attention and concentration, generally less manic residual, less 

guarded/ on direct exam pt is calm,conversant, organized; recalls disclosure in 

pm speaker meeting with mother and myself yesterday - affirming syndromal 

history for mood swings and ? psychotic vulnerability emerging 5 + years ago in 

parallel with binge drinking pattern.  He again affirms  initial psychiatric 

attention began 3 yrs ago including several inpt intervention for mixed manic/

alcoholic crises; he also affirms his poor compliance with community treatment 

over these previous years and continuing to the present time which he firmly 

states will not continue post DC from this inpt intervention; goes on to 

disclose odd and disturbing family history including his mother spending 15 yr 

in [employer] as an accomplice to murder; is agreeable to further intake on 

personal and family history in followup;  also apparently has worked for the 

[employer] which lends reality basis feeding into delusional thought pattern about [facility] 

tracking him PTA.





ASSESSMENT/PLAN: improving course as reported by Nursing and observed directly 

on direct exam/ no current change in medications or management plan as d/w 

Nursing in Rounds; workup and DC planning in process





Objective: 





 Vital Signs











Temp Pulse Resp BP Pulse Ox


 


 36.2 C   72   16   119/78   97 


 


 07/11/17 06:00  07/11/17 06:00  07/11/17 06:00  07/11/17 06:00  07/11/17 06:00














ICD10 Worksheet


Patient Problems: 


 Problems











Problem Status Onset


 


Altered mental status Acute  


 


Facial contusion Acute  


 


Seizure Acute

## 2017-07-11 NOTE — SOAPPROG
SOAP Progress Note


Assessment/Plan: 


Assessment:


























Plan:





07/11/17 08:00


DAY 3/ 30'


UPDATE/EXAM: Nursing reports pt is descriptively improving with less elevation, 

improving attention and concentration, generally less manic residual, less 

guarded/ on direct exam pt is calm,conversant, organized; recalls disclosure in 

pm speaker meeting with mother and myself yesterday - affirming syndromal 

history for mood swings and ? psychotic vulnerability emerging 5 + years ago in 

parallel with binge drinking pattern.  He again affirms  initial psychiatric 

attention began 3 yrs ago including several inpt intervention for mixed manic/

alcoholic crises; he also affirms his poor compliance with community treatment 

over these previous years and continuing to the present time which he firmly 

states will not continue post DC from this inpt intervention; goes on to 

disclose odd and disturbing family history including his mother spending 15 yr 

in detention as an accomplice to murder; is agreeable to further intake on 

personal and family history in followup;  also apparently has worked for the 

[employer] which lends reality basis feeding into delusional thought pattern about [employer] 

tracking him PTA.





ASSESSMENT/PLAN: improving course as reported by Nursing and observed directly 

on direct exam/ no current change in medications or management plan as d/w 

Nursing in Rounds; workup and DC planning in process





Objective: 





 Vital Signs











Temp Pulse Resp BP Pulse Ox


 


 36.2 C   72   16   119/78   97 


 


 07/11/17 06:00  07/11/17 06:00  07/11/17 06:00  07/11/17 06:00  07/11/17 06:00














ICD10 Worksheet


Patient Problems: 


 Problems











Problem Status Onset


 


Altered mental status Acute  


 


Facial contusion Acute  


 


Seizure Acute

## 2017-07-12 NOTE — SOAPPROG
SOAP Progress Note


Assessment/Plan: 


Assessment:


























Plan:





07/11/17 08:00


DAY 3/ 30'


UPDATE/EXAM: Nursing reports pt is descriptively improving with less elevation, 

improving attention and concentration, generally less manic residual, less 

guarded/ on direct exam pt is calm,conversant, organized; recalls disclosure in 

pm speaker meeting with mother and myself yesterday - affirming syndromal 

history for mood swings and ? psychotic vulnerability emerging 5 + years ago in 

parallel with binge drinking pattern.  He again affirms  initial psychiatric 

attention began 3 yrs ago including several inpt intervention for mixed manic/

alcoholic crises; he also affirms his poor compliance with community treatment 

over these previous years and continuing to the present time which he firmly 

states will not continue post DC from this inpt intervention; goes on to 

disclose odd and disturbing family history including his mother spending 15 yr 

in [facility] as an accomplice to murder; is agreeable to further intake on 

personal and family history in followup;  also apparently has worked for the 

[employer] which lends reality basis feeding into delusional thought pattern about [facility] 

tracking him PTA.





ASSESSMENT/PLAN: improving course as reported by Nursing and observed directly 

on direct exam/ no current change in medications or management plan as d/w 

Nursing in Rounds; workup and DC planning in process





07/12/17


DAY 4/ 30'


UPDATE/EXAM: Nursing reports pt slept better but evidenced lability and brief 

flash of anger and insistence on knowing how "to get out of here" this AM; on 

direct exam pt's preoccupation with leaving became more urgent with his asking 

finally if he could be dc'd this PM with mother after detailed discussion about 

meds changes I am recommending in followup to the detailed disclosure in 

yesterday's meeting; pt did evidence some SIMÓN and could not explain his urgency 

for which was particularly illogical as we finished discussion of meds changes 

to better manage his breakthru anxiety, acute "fear" (code word for residual 

paranoia) and possible nightmares.





ASSESSMENT/PLAN: acute mental status demonstrated in session today including 

increased anxiety and circumscribed PI; MOC reporting to CC that's she is 

hearing residual PI a/w persecutroy delusions; history disclosed suggests 

alcohol addiction has been masking level of mood instability and psychosis 

which has surfaced more acutely since pt gained sobriety 3 month ago/ meds 

changes include updosing of Seroquel from 350 mg qd to 400 mg qd, DC Ativan, 

adding Vistaril standing and prn, adding 2 mg Prazosin hs; likely will extend 

inpt course intoo next week given implications of history and direct 

examinational findings





Objective: 





 Vital Signs











Temp Pulse Resp BP Pulse Ox


 


 36.8 C   62   14   108/69   98 


 


 07/12/17 06:14  07/12/17 06:14  07/12/17 06:14  07/12/17 06:14  07/12/17 06:14














ICD10 Worksheet


Patient Problems: 


 Problems











Problem Status Onset


 


Altered mental status Acute  


 


Facial contusion Acute  


 


Seizure Acute

## 2017-07-12 NOTE — SOAPPROG
SOAP Progress Note


Assessment/Plan: 


Assessment:


























Plan:





07/11/17 08:00


DAY 3/ 30'


UPDATE/EXAM: Nursing reports pt is descriptively improving with less elevation, 

improving attention and concentration, generally less manic residual, less 

guarded/ on direct exam pt is calm,conversant, organized; recalls disclosure in 

pm speaker meeting with mother and myself yesterday - affirming syndromal 

history for mood swings and ? psychotic vulnerability emerging 5 + years ago in 

parallel with binge drinking pattern.  He again affirms  initial psychiatric 

attention began 3 yrs ago including several inpt intervention for mixed manic/

alcoholic crises; he also affirms his poor compliance with community treatment 

over these previous years and continuing to the present time which he firmly 

states will not continue post DC from this inpt intervention; goes on to 

disclose odd and disturbing family history including his mother spending 15 yr 

in detention as an accomplice to murder; is agreeable to further intake on 

personal and family history in followup;  also apparently has worked for the 

[employer] which lends reality basis feeding into delusional thought pattern about [facility] 

tracking him PTA.





ASSESSMENT/PLAN: improving course as reported by Nursing and observed directly 

on direct exam/ no current change in medications or management plan as d/w 

Nursing in Rounds; workup and DC planning in process





07/12/17


D


AY 4/ 30'


UPDATE/EXAM:





Objective: 





 Vital Signs











Temp Pulse Resp BP Pulse Ox


 


 36.8 C   62   14   108/69   98 


 


 07/12/17 06:14  07/12/17 06:14  07/12/17 06:14  07/12/17 06:14  07/12/17 06:14














ICD10 Worksheet


Patient Problems: 


 Problems











Problem Status Onset


 


Altered mental status Acute  


 


Facial contusion Acute  


 


Seizure Acute

## 2017-07-12 NOTE — SOAPPROG
SOAP Progress Note


Assessment/Plan: 


Assessment:


























Plan:





07/11/17 08:00


DAY 3/ 30'


UPDATE/EXAM: Nursing reports pt is descriptively improving with less elevation, 

improving attention and concentration, generally less manic residual, less 

guarded/ on direct exam pt is calm,conversant, organized; recalls disclosure in 

pm speaker meeting with mother and myself yesterday - affirming syndromal 

history for mood swings and ? psychotic vulnerability emerging 5 + years ago in 

parallel with binge drinking pattern.  He again affirms  initial psychiatric 

attention began 3 yrs ago including several inpt intervention for mixed manic/

alcoholic crises; he also affirms his poor compliance with community treatment 

over these previous years and continuing to the present time which he firmly 

states will not continue post DC from this inpt intervention; goes on to 

disclose odd and disturbing family history including his mother spending 15 yr 

in senior care as an accomplice to murder; is agreeable to further intake on 

personal and family history in followup;  also apparently has worked for the 

[employer] which lends reality basis feeding into delusional thought pattern about [facility] 

tracking him PTA.





ASSESSMENT/PLAN: improving course as reported by Nursing and observed directly 

on direct exam/ no current change in medications or management plan as d/w 

Nursing in Rounds; workup and DC planning in process





07/12/17


D


AY 4/ 30'


UPDATE/EXAM:





Objective: 





 Vital Signs











Temp Pulse Resp BP Pulse Ox


 


 36.8 C   62   14   108/69   98 


 


 07/12/17 06:14  07/12/17 06:14  07/12/17 06:14  07/12/17 06:14  07/12/17 06:14














ICD10 Worksheet


Patient Problems: 


 Problems











Problem Status Onset


 


Altered mental status Acute  


 


Facial contusion Acute  


 


Seizure Acute

## 2017-07-13 RX ADMIN — PRAZOSIN HYDROCHLORIDE SCH MG: 5 CAPSULE ORAL at 20:47

## 2017-07-13 NOTE — SOAPPROG
SOAP Progress Note


Assessment/Plan: 


Assessment:


























Plan:





07/11/17 08:00


DAY 3/ 30'


UPDATE/EXAM: Nursing reports pt is descriptively improving with less elevation, 

improving attention and concentration, generally less manic residual, less 

guarded/ on direct exam pt is calm,conversant, organized; recalls disclosure in 

pm speaker meeting with mother and myself yesterday - affirming syndromal 

history for mood swings and ? psychotic vulnerability emerging 5 + years ago in 

parallel with binge drinking pattern.  He again affirms  initial psychiatric 

attention began 3 yrs ago including several inpt intervention for mixed manic/

alcoholic crises; he also affirms his poor compliance with community treatment 

over these previous years and continuing to the present time which he firmly 

states will not continue post DC from this inpt intervention; goes on to 

disclose odd and disturbing family history including his mother spending 15 yr 

in jail as an accomplice to murder; is agreeable to further intake on 

personal and family history in followup;  also apparently has worked for the 

[employer] which lends reality basis feeding into delusional thought pattern about [facility] 

tracking him PTA.





ASSESSMENT/PLAN: improving course as reported by Nursing and observed directly 

on direct exam/ no current change in medications or management plan as d/w 

Nursing in Rounds; workup and DC planning in process





07/12/17


DAY 4/ 30'


UPDATE/EXAM: Nursing reports pt slept better but evidenced lability and brief 

flash of anger and insistence on knowing how "to get out of here" this AM; on 

direct exam pt's preoccupation with leaving became more urgent with his asking 

finally if he could be dc'd this PM with mother after detailed discussion about 

meds changes I am recommending in followup to the detailed disclosure in 

yesterday's meeting; pt did evidence some SIMÓN and could not explain his urgency 

for which was particularly illogical as we finished discussion of meds changes 

to better manage his breakthru anxiety, acute "fear" (code word for residual 

paranoia) and possible nightmares.





ASSESSMENT/PLAN: acute mental status demonstrated in session today including 

increased anxiety and circumscribed PI; MOC reporting to CC that's she is 

hearing residual PI a/w persecutory delusions; history disclosed suggests 

alcohol addiction has been masking level of mood instability and psychosis 

which has surfaced more acutely since pt gained sobriety 3 month ago/ meds 

changes include updosing of Seroquel from 350 mg qd to 400 mg qd, DC Ativan, 

adding Vistaril standing and prn, adding 2 mg Prazosin hs; likely will extend 

inpt course in toonext week given implications of history and direct 

examinational findings





07/13/17 11:37


DAY 5/ 30'


UPDATE/EXAM: Nursing reports pt c/w cares and meds, slept 5 + hours; pt states 

that he had several nitemare awakenings; on direct exam pt presents as mildly 

labile, evidences circumscribed persecutory delusions about various law 

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

factly and wants assistance from me in reality-testing who he is if need be; he 

is agreeable to extending inpt stay contingent on his having his service dog 

with him on the unit which we can do.  Productive speaker phone meeting tammy 

MOC and pt during this session.





ASSESSMENT/PLAN: residua psychotic acuity and affective lability/ will increase 

Minipress to 5 mg, no other meds changes today; management including adding his 

service dog d/w Nursing in Rounds











Objective: 





 Vital Signs











Temp Pulse Resp BP Pulse Ox


 


 36.3 C   75   14   115/73   94 


 


 07/13/17 04:36  07/13/17 04:36  07/13/17 04:36  07/13/17 04:36  07/13/17 04:36














ICD10 Worksheet


Patient Problems: 


 Problems











Problem Status Onset


 


Altered mental status Acute  


 


Facial contusion Acute  


 


Seizure Acute

## 2017-07-14 RX ADMIN — NICOTINE POLACRILEX PRN MG: 2 GUM, CHEWING BUCCAL at 20:21

## 2017-07-14 RX ADMIN — PRAZOSIN HYDROCHLORIDE SCH MG: 5 CAPSULE ORAL at 21:46

## 2017-07-14 RX ADMIN — NICOTINE POLACRILEX PRN MG: 2 GUM, CHEWING BUCCAL at 18:36

## 2017-07-14 RX ADMIN — ZOLPIDEM TARTRATE PRN MG: 5 TABLET ORAL at 21:47

## 2017-07-14 RX ADMIN — NICOTINE POLACRILEX PRN MG: 2 GUM, CHEWING BUCCAL at 15:30

## 2017-07-14 NOTE — SOAPPROG
SOAP Progress Note


Assessment/Plan: 


Assessment:


























Plan:





07/11/17 08:00


DAY 3/ 30'


UPDATE/EXAM: Nursing reports pt is descriptively improving with less elevation, 

improving attention and concentration, generally less manic residual, less 

guarded/ on direct exam pt is calm,conversant, organized; recalls disclosure in 

pm speaker meeting with mother and myself yesterday - affirming syndromal 

history for mood swings and ? psychotic vulnerability emerging 5 + years ago in 

parallel with binge drinking pattern.  He again affirms  initial psychiatric 

attention began 3 yrs ago including several inpt intervention for mixed manic/

alcoholic crises; he also affirms his poor compliance with community treatment 

over these previous years and continuing to the present time which he firmly 

states will not continue post DC from this inpt intervention; goes on to 

disclose odd and disturbing family history including his mother spending 15 yr 

in [place] as an accomplice to murder; is agreeable to further intake on 

personal and family history in followup;  also apparently has worked for the 

[employer] which lends reality basis feeding into delusional thought pattern about [facility] 

tracking him PTA.





ASSESSMENT/PLAN: improving course as reported by Nursing and observed directly 

on direct exam/ no current change in medications or management plan as d/w 

Nursing in Rounds; workup and DC planning in process





07/12/17


DAY 4/ 30'


UPDATE/EXAM: Nursing reports pt slept better but evidenced lability and brief 

flash of anger and insistence on knowing how "to get out of here" this AM; on 

direct exam pt's preoccupation with leaving became more urgent with his asking 

finally if he could be dc'd this PM with mother after detailed discussion about 

meds changes I am recommending in followup to the detailed disclosure in 

yesterday's meeting; pt did evidence some SIMÓN and could not explain his urgency 

for which was particularly illogical as we finished discussion of meds changes 

to better manage his breakthru anxiety, acute "fear" (code word for residual 

paranoia) and possible nightmares.





ASSESSMENT/PLAN: acute mental status demonstrated in session today including 

increased anxiety and circumscribed PI; MOC reporting to CC that's she is 

hearing residual PI a/w persecutory delusions; history disclosed suggests 

alcohol addiction has been masking level of mood instability and psychosis 

which has surfaced more acutely since pt gained sobriety 3 month ago/ meds 

changes include updosing of Seroquel from 350 mg qd to 400 mg qd, DC Ativan, 

adding Vistaril standing and prn, adding 2 mg Prazosin hs; likely will extend 

inpt course in toonext week given implications of history and direct 

examinational findings





07/13/17 11:37


DAY 5/ 30'


UPDATE/EXAM: Nursing reports pt c/w cares and meds, slept 5 + hours; pt states 

that he had several nitemare awakenings; on direct exam pt presents as mildly 

labile, evidences circumscribed persecutory delusions about various law 

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

factly and wants assistance from me in reality-testing who he is if need be; he 

is agreeable to extending inpt stay contingent on his having his service dog 

with him on the unit which we can do.  Productive speaker phone meeting tammy 

MOC and pt during this session.





ASSESSMENT/PLAN: residual psychotic acuity and affective lability/ will 

increase Minipress to 5 mg, no other meds changes today; management including 

adding his service dog d/w Nursing in Rounds





07/14/17 11:30


DAY 6/ 30'


UPDATE/EXAM: Nursing reports pt again slept 5 hrs; pt reports several nitemare 

awakenings wi Nursing didn't know about and wonders about his reliability in 

reporting; service dog adapting very well on the unit/ on direct exam pt 

presents as anxious and immersed in the details of his mixed grandiose and 

persecutory organized delusions, apparently more pronounced after his 

misinterpretation of interaction with his CC in which pt thought CC was denying 

a conversation they'd had yesterday; able to discuss meds changes as referenced 

and understands that inpt course may be extended when assessed after the 

weekend to further lower his anxiety and diminish the paranoid fearfulness; his 

reality testing capacity allowed him to understand the meds changes and the 

likelihood of extending his stay which he accepted. 





ASSESSMENT/PLAN: intensification of his delusional thought disorder in context 

of increased anxiety about paranoid fears/ will increase Seroquel at hs to 400 

mg, add Ambien 10 mg hs, encourage Nursing and pt to use prn Vistaril liberally

; will coby pt AG's and include walking the dog outside with supervision; pt 

understands his case will be transferred to Dr Kohli in AM





Objective: 





 Vital Signs











Temp Pulse Resp BP Pulse Ox


 


 36.4 C   70   18   105/72   96 


 


 07/14/17 06:00  07/14/17 06:00  07/14/17 06:00  07/14/17 06:00  07/14/17 06:00














ICD10 Worksheet


Patient Problems: 


 Problems











Problem Status Onset


 


Altered mental status Acute  


 


Facial contusion Acute  


 


Seizure Acute

## 2017-07-15 RX ADMIN — NICOTINE POLACRILEX PRN MG: 2 GUM, CHEWING BUCCAL at 10:55

## 2017-07-15 RX ADMIN — NICOTINE POLACRILEX PRN MG: 2 GUM, CHEWING BUCCAL at 14:44

## 2017-07-15 RX ADMIN — NICOTINE POLACRILEX PRN MG: 2 GUM, CHEWING BUCCAL at 18:49

## 2017-07-15 RX ADMIN — ZOLPIDEM TARTRATE PRN MG: 5 TABLET ORAL at 21:00

## 2017-07-15 RX ADMIN — NICOTINE POLACRILEX PRN MG: 2 GUM, CHEWING BUCCAL at 08:27

## 2017-07-15 RX ADMIN — NICOTINE POLACRILEX PRN MG: 2 GUM, CHEWING BUCCAL at 05:55

## 2017-07-15 NOTE — SOAPPROG
SOAP Progress Note


Assessment/Plan: 


Assessment:


























Plan:





07/11/17 08:00


DAY 3/ 30'


UPDATE/EXAM: Nursing reports pt is descriptively improving with less elevation, 

improving attention and concentration, generally less manic residual, less 

guarded/ on direct exam pt is calm,conversant, organized; recalls disclosure in 

pm speaker meeting with mother and myself yesterday - affirming syndromal 

history for mood swings and ? psychotic vulnerability emerging 5 + years ago in 

parallel with binge drinking pattern.  He again affirms  initial psychiatric 

attention began 3 yrs ago including several inpt intervention for mixed manic/

alcoholic crises; he also affirms his poor compliance with community treatment 

over these previous years and continuing to the present time which he firmly 

states will not continue post DC from this inpt intervention; goes on to 

disclose odd and disturbing family history including his mother spending 15 yr 

in [facility] as an accomplice to murder; is agreeable to further intake on 

personal and family history in followup;  also apparently has worked for the 

[employer] which lends reality basis feeding into delusional thought pattern about [facility] 

tracking him PTA.





ASSESSMENT/PLAN: improving course as reported by Nursing and observed directly 

on direct exam/ no current change in medications or management plan as d/w 

Nursing in Rounds; workup and DC planning in process





07/12/17


DAY 4/ 30'


UPDATE/EXAM: Nursing reports pt slept better but evidenced lability and brief 

flash of anger and insistence on knowing how "to get out of here" this AM; on 

direct exam pt's preoccupation with leaving became more urgent with his asking 

finally if he could be dc'd this PM with mother after detailed discussion about 

meds changes I am recommending in followup to the detailed disclosure in 

yesterday's meeting; pt did evidence some SIMÓN and could not explain his urgency 

for which was particularly illogical as we finished discussion of meds changes 

to better manage his breakthru anxiety, acute "fear" (code word for residual 

paranoia) and possible nightmares.





ASSESSMENT/PLAN: acute mental status demonstrated in session today including 

increased anxiety and circumscribed PI; MOC reporting to CC that's she is 

hearing residual PI a/w persecutory delusions; history disclosed suggests 

alcohol addiction has been masking level of mood instability and psychosis 

which has surfaced more acutely since pt gained sobriety 3 month ago/ meds 

changes include updosing of Seroquel from 350 mg qd to 400 mg qd, DC Ativan, 

adding Vistaril standing and prn, adding 2 mg Prazosin hs; likely will extend 

inpt course in toonext week given implications of history and direct 

examinational findings





07/13/17 11:37


DAY 5/ 30'


UPDATE/EXAM: Nursing reports pt c/w cares and meds, slept 5 + hours; pt states 

that he had several nitemare awakenings; on direct exam pt presents as mildly 

labile, evidences circumscribed persecutory delusions about various law 

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

factly and wants assistance from me in reality-testing who he is if need be; he 

is agreeable to extending inpt stay contingent on his having his service dog 

with him on the unit which we can do.  Productive speaker phone meeting with 

MOC and pt during this session.





ASSESSMENT/PLAN: residual psychotic acuity and affective lability/ will 

increase Minipress to 5 mg, no other meds changes today; management including 

adding his service dog d/w Nursing in Rounds





07/14/17 11:30


DAY 6/ 30'


UPDATE/EXAM: Nursing reports pt again slept 5 hrs; pt reports several nitemare 

awakenings which Nursing didn't know about and wonders about his reliability in 

reporting; service dog adapting very well on the unit/ on direct exam pt 

presents as anxious and immersed in the details of his mixed grandiose and 

persecutory organized delusions, apparently more pronounced after his 

misinterpretation of interaction with his CC in which pt thought CC was denying 

a conversation they'd had yesterday; able to discuss meds changes as referenced 

and understands that inpt course may be extended when assessed after the 

weekend to further lower his anxiety and diminish the paranoid fearfulness; his 

reality testing capacity allowed him to understand the meds changes and the 

likelihood of extending his stay which he accepted. 





ASSESSMENT/PLAN: intensification of his delusional thought disorder in context 

of increased anxiety about paranoid fears/ will increase Seroquel at hs to 400 

mg, add Ambien 10 mg hs, encourage Nursing and pt to use prn Vistaril liberally

; will coby pt AG's and include walking the dog outside with supervision; pt 

understands his case will be transferred to Dr Kohli in AM








Objective: 





 Vital Signs











Temp Pulse Resp BP Pulse Ox


 


 36.7 C   88   12   140/78 H  96 


 


 07/15/17 06:00  07/15/17 06:00  07/15/17 06:00  07/15/17 06:00  07/15/17 06:00














ICD10 Worksheet


Patient Problems: 


 Problems











Problem Status Onset


 


Altered mental status Acute  


 


Facial contusion Acute  


 


Seizure Acute

## 2017-07-15 NOTE — SOAPPROG
SOAP Progress Note


Assessment/Plan: 


Assessment:


























Plan:





07/15/17 14:19


After long discussion of sxs and prior/current medications, patient agreed to 

following plan. First goal is to reduce polypharmacy and use the least amount 

of drugs that are effective with lowest risk/SE profiles. Patient stated he 

wanted to be off the hydroxyzine as well as the minipress. He wanted to reduce 

the dose of Seroquel, but MD strongly encouraged him to continue on this as it 

is an important mood stabilizer. MD encouraged patient to consider more 

effective medications for bipolar manic type including the primary standard of 

care which is lithium and depakote. However, patient says when he took VPA in 

past it made his brain "turn off" and he gained too much weight. He was willing 

to consider lithium though was considered about dehydration. MD suggested he 

titrate on med slowly while checking levels regularly to see how his body 

responds, and to always be aware of staying well hydrated. The benefits of 

lithium, in general the most effective mood stabilizer for valentina in combo with 

VPA or SGA, may outweigh the risks which can be monitored for this patient. 

Patient said he was willing to discuss with his outpatient provider after d/c. 

For now, patient agreed his goal should be sufficient stability to d/c safely 

home and continue tx on outpatient basis. Patient feels he is "almost there." 

He denied any paranoia, did not report delusions of KARLOS or government 

conspiracy against him as he had done on admission. He showed no s/s of AH/VH, 

int/ext stim. He denied AH/VH, SI/HI. Will make the following med changes:


1. D/C minipress


2. D/C hydroxyzine scheduled


3. Add Ativan for valentina, sleep and anxiety


4. Decrease Seroquel to 25mg BID for daytime anxiety/psychosis


5. continue with Seroquel 400mg QHS and Lamictal 200mg BID for mood (explained 

to patient that Lamictal was less effective for bipolar manic type than for 

depressed type, but he says it has "worked" in past and wants to continue on 

this medication)


6. Continue Ambien 10mg QHS PRN for sleep


7. Add melatonin 3mg QHS PRN for sleep


Subjective: 


Long conversation with patient today regarding his h/o psychiatric sxs, past/

present psych diagnoses, and various combinations of meds that have been tried 

over years to treat problems. Patient spent a long time drawing a diagram of 

what he has been told in past are his main psych dx: bipolar (he reports mostly 

manic episodes with infrequent but severe depressive episodes), RICKIE, "sleep 

disorder" and ADHD. He says he took Ritalin and other stimulants in past, but 

they always exacerbated his manic sxs. He reports frequent bouts of insomnia 

and occasional episodes of "no sleep" for 4-7 days at a time. He says "I had 

ulcers as a teenager" when his MOC went to senior care. He associates ulcers with 

"stress" from family situation. He also claims that his anxiety and his 

insomnia are usually significantly worse when "I'm going through a lot of stress

, like now." He reports there are "things going on" in his life that are 

causing him stress, but he expects they will "go away on their own" in short 

period of time, and he fully expects his sleep and anxiety to "get better." 

Patient says he feels like he is on "too many medications" and complains that 

he feel like when he has gone to see psychiatrists in past, they have always 

"just given me more meds" for whatever problem was "bothering me at the time." 

He reports taking Ambien, Benadryl, xanax, ativan, klonopin and Seroquel in 

past for sleep and anxiety. He has also taken Lamictal, Depakote and Seroquel 

for mood. He was prescribed Lexapro in past for depression, and says it "helped

" but then it "stopped working." MD explained risks of taking AD meds for 

people with h/o valentina. He was very clear that he has "many more" manic episodes 

than depression and he only has one or the other at any given time. He states 

that a doctor in past recommended trial of lithium, but advised against it b/c 

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 

concerned "about dehydration." This MD encouraged patient establish care with a 

psychiatrist patient felt he could trust and would see reliably on regular 

basis to sort out the many sxs and possible co-morbid conditions that patient 

describes and recommend a reasonable combination of treatments that include 

some medications (carefully weighing the risks and benefits of various med 

combos) as well as non-pharmacologic interventions. This MD discussed options 

such as CBT for insomnia, as well as meditation, yoga and exercise to help with 

sleep and anxiety. Also records indicate patient has extensive h/o alcohol 

binging which patient did not mention today and which likely has exacerbated 

sleep, mood and anxiety related problems over a long time. 





Objective: 





 Vital Signs











Temp Pulse Resp BP Pulse Ox


 


 36.7 C   88   12   140/78 H  96 


 


 07/15/17 06:00  07/15/17 06:00  07/15/17 06:00  07/15/17 06:00  07/15/17 06:00








MSE Less pressured speech, fewer racing thoughts, patient is more goal directed

, but still rapid speech and distractibility Affect: mildly elevated Mood: 

"Anxious" TP: clearer, more organized TC: denies AH/VH, SI/HI, no evidence of 

paranoia





- Time Spent With Patient


Time Spent With Patient: 





45'





- Pending Discharge


Pending Discharge Within 24 Hours: No


Pending Discharge Within 48 Hours: No


Pending Discharge Date: 07/18/17 (possible d/c on 7/18/17, but patient needs to 

have his appt at [facility] rescheduled from 7/17/17 to later in week)





ICD10 Worksheet


Patient Problems: 


 Problems











Problem Status Onset


 


Severe manic bipolar 1 disorder with psychotic behavior Acute  


 


Seizure Acute  














- ICD10 Problem Qualifiers


(1) Severe manic bipolar 1 disorder with psychotic behavior

## 2017-07-16 VITALS — TEMPERATURE: 98.4 F

## 2017-07-16 RX ADMIN — NICOTINE POLACRILEX PRN MG: 2 GUM, CHEWING BUCCAL at 16:07

## 2017-07-16 RX ADMIN — NICOTINE POLACRILEX PRN MG: 2 GUM, CHEWING BUCCAL at 05:07

## 2017-07-16 RX ADMIN — ZOLPIDEM TARTRATE PRN MG: 5 TABLET ORAL at 20:14

## 2017-07-16 RX ADMIN — NICOTINE POLACRILEX PRN MG: 2 GUM, CHEWING BUCCAL at 09:11

## 2017-07-16 RX ADMIN — NICOTINE POLACRILEX PRN MG: 2 GUM, CHEWING BUCCAL at 11:21

## 2017-07-16 RX ADMIN — NICOTINE POLACRILEX PRN MG: 2 GUM, CHEWING BUCCAL at 13:32

## 2017-07-16 NOTE — SOAPPROG
SOAP Progress Note


Assessment/Plan: 


Assessment:


























Plan:





07/15/17 14:19


After long discussion of sxs and prior/current medications, patient agreed to 

following plan. First goal is to reduce polypharmacy and use the least amount 

of drugs that are effective with lowest risk/SE profiles. Patient stated he 

wanted to be off the hydroxyzine as well as the minipress. He wanted to reduce 

the dose of Seroquel, but MD strongly encouraged him to continue on this as it 

is an important mood stabilizer. MD encouraged patient to consider more 

effective medications for bipolar manic type including the primary standard of 

care which is lithium and depakote. However, patient says when he took VPA in 

past it made his brain "turn off" and he gained too much weight. He was willing 

to consider lithium though was considered about dehydration. MD suggested he 

titrate on med slowly while checking levels regularly to see how his body 

responds, and to always be aware of staying well hydrated. The benefits of 

lithium, in general the most effective mood stabilizer for valentina in combo with 

VPA or SGA, may outweigh the risks which can be monitored for this patient. 

Patient said he was willing to discuss with his outpatient provider after d/c. 

For now, patient agreed his goal should be sufficient stability to d/c safely 

home and continue tx on outpatient basis. Patient feels he is "almost there." 

He denied any paranoia, did not report delusions of KARLOS or government 

conspiracy against him as he had done on admission. He showed no s/s of AH/VH, 

int/ext stim. He denied AH/VH, SI/HI. Will make the following med changes:


1. D/C minipress


2. D/C hydroxyzine scheduled


3. Add Ativan for valentina, sleep and anxiety


4. Decrease Seroquel to 25mg BID for daytime anxiety/psychosis


5. continue with Seroquel 400mg QHS and Lamictal 200mg BID for mood (explained 

to patient that Lamictal was less effective for bipolar manic type than for 

depressed type, but he says it has "worked" in past and wants to continue on 

this medication)


6. Continue Ambien 10mg QHS PRN for sleep


7. Add melatonin 3mg QHS PRN for sleep





07/16/17 12:50


Plan:


1. [facility]- patient says med changes are "working" well, has no complaints


2. Needs to find an outpatient provider, says he wants someone new so he can 

start with a "clean slate" 


3. Likely d/c on Tuesday


Subjective: 


Patient states he is feeling "better" today after med changes and getting a 

"good night's sleep." He says he is feeling "calmer" and more relaxed since 

starting Ativan. He also says the Ambien last night "helped with sleep." 

Overall he reports, "I feel really good." He has less pressured speech, no 

report of racing thoughts and less hyperactivity. He denies AH/VH and SI/HI. 





Objective: 





 Vital Signs











Temp Pulse Resp BP Pulse Ox


 


 36.9 C   80   16   128/74 H  94 


 


 07/16/17 06:00  07/16/17 06:00  07/16/17 06:00  07/16/17 06:00  07/16/17 06:00








MSE: Pleasant, cooperative. Affect: Bright  Mood: "Really good" TP: more linear 

and goal-directed TC: Denies AH/VH, SI/HI no paranoia or delusions


Insight/Judgment: Improving





- Time Spent With Patient


Time Spent With Patient: 


20'








- Pending Discharge


Pending Discharge Within 24 Hours: No


Pending Discharge Within 48 Hours: Yes


Pending Discharge Date: 07/18/17 (As long as patient continues to demonstrate 

mood stability and has f/u appts in place)


Pending Discharge Time: 14:00





ICD10 Worksheet


Patient Problems: 


 Problems











Problem Status Onset


 


Severe manic bipolar 1 disorder with psychotic behavior Acute  


 


Seizure Acute  














- ICD10 Problem Qualifiers


(1) Severe manic bipolar 1 disorder with psychotic behavior

## 2017-07-17 VITALS
DIASTOLIC BLOOD PRESSURE: 84 MMHG | OXYGEN SATURATION: 96 % | HEART RATE: 74 BPM | SYSTOLIC BLOOD PRESSURE: 126 MMHG | RESPIRATION RATE: 14 BRPM

## 2017-07-17 RX ADMIN — NICOTINE POLACRILEX PRN MG: 2 GUM, CHEWING BUCCAL at 12:35

## 2017-07-17 RX ADMIN — NICOTINE POLACRILEX PRN MG: 2 GUM, CHEWING BUCCAL at 04:26

## 2017-07-17 RX ADMIN — NICOTINE POLACRILEX PRN MG: 2 GUM, CHEWING BUCCAL at 10:32

## 2017-07-17 NOTE — SOAPPROG
SOAP Progress Note


Assessment/Plan: 


Assessment:


























Plan:





07/15/17 14:19


After long discussion of sxs and prior/current medications, patient agreed to 

following plan. First goal is to reduce polypharmacy and use the least amount 

of drugs that are effective with lowest risk/SE profiles. Patient stated he 

wanted to be off the hydroxyzine as well as the minipress. He wanted to reduce 

the dose of Seroquel, but MD strongly encouraged him to continue on this as it 

is an important mood stabilizer. MD encouraged patient to consider more 

effective medications for bipolar manic type including the primary standard of 

care which is lithium and depakote. However, patient says when he took VPA in 

past it made his brain "turn off" and he gained too much weight. He was willing 

to consider lithium though was considered about dehydration. MD suggested he 

titrate on med slowly while checking levels regularly to see how his body 

responds, and to always be aware of staying well hydrated. The benefits of 

lithium, in general the most effective mood stabilizer for valentina in combo with 

VPA or SGA, may outweigh the risks which can be monitored for this patient. 

Patient said he was willing to discuss with his outpatient provider after d/c. 

For now, patient agreed his goal should be sufficient stability to d/c safely 

home and continue tx on outpatient basis. Patient feels he is "almost there." 

He denied any paranoia, did not report delusions of KARLOS or government 

conspiracy against him as he had done on admission. He showed no s/s of AH/VH, 

int/ext stim. He denied AH/VH, SI/HI. Will make the following med changes:


1. D/C minipress


2. D/C hydroxyzine scheduled


3. Add Ativan for valentina, sleep and anxiety


4. Decrease Seroquel to 25mg BID for daytime anxiety/psychosis


5. continue with Seroquel 400mg QHS and Lamictal 200mg BID for mood (explained 

to patient that Lamictal was less effective for bipolar manic type than for 

depressed type, but he says it has "worked" in past and wants to continue on 

this medication)


6. Continue Ambien 10mg QHS PRN for sleep


7. Add melatonin 3mg QHS PRN for sleep





07/16/17 12:50


Plan:


1. [facility]- patient says med changes are "working" well, has no complaints


2. Needs to find an outpatient provider, says he wants someone new so he can 

start with a "clean slate" 


3. Likely d/c on Tuesday 07/17/17 14:27


Plan:


1. Patient says he feels 80% better and wants to d/c today. 


2. Patient has made f/u appts with PCP, Dr. Mancini at [facility] on 7/ 18/17 at 1:30pm


3. Patient will get referral for psych MD and therapist through [facility]


4. MO plans to stay with patient after discharge for support and supervision


5. 


Subjective: 


Patient says he feels "75-80% better" and would like to discharge today. He has 

already contacted his PCP, Dr. Mancini at [facility] for a f/u appt. 

He says that he can also get a referral for a psychiatrist and a therapist from 

Dr. Mancini's office. But he knows that  has set him up with an intake at [facility] 

later this week. Patient says his MOC will stay with him after d/c for support. 

He also plans to attend 12 step group through [facility] at his local 

[employer]. He denies any AH/VH, SI/HI.  





Objective: 





 Vital Signs











Temp Pulse Resp BP Pulse Ox


 


 36.9 C   74   14   126/84 H  96 


 


 07/17/17 04:55  07/17/17 04:55  07/17/17 04:55  07/17/17 04:55  07/17/17 04:55








MSE: Pleasant, calm, cooperative. Affect: Bright  Mood: "Great"  TP: Linear, 

goal-directed TC: NO Ah/VH, SI/HI Insight/Judgment: Fair/Fair





- Time Spent With Patient


Time Spent With Patient: 


25"








- Pending Discharge


Pending Discharge Within 24 Hours: Yes


Pending Discharge Date: 07/18/17


Pending Discharge Time: 11:00





ICD10 Worksheet


Patient Problems: 


 Problems











Problem Status Onset


 


Severe manic bipolar 1 disorder with psychotic behavior Acute  


 


Seizure Acute  














- ICD10 Problem Qualifiers


(1) Severe manic bipolar 1 disorder with psychotic behavior

## 2017-08-23 ENCOUNTER — HOSPITAL ENCOUNTER (EMERGENCY)
Dept: HOSPITAL 80 - FED | Age: 37
LOS: 1 days | Discharge: PSYCHIATRIC OR SUBSTANCE USE FACILITY | End: 2017-08-24
Payer: MEDICAID

## 2017-08-23 VITALS — RESPIRATION RATE: 16 BRPM

## 2017-08-23 DIAGNOSIS — F31.9: Primary | ICD-10-CM

## 2017-08-23 LAB
% IMMATURE GRANULYOCYTES: 0.6 % (ref 0–1.1)
ABSOLUTE IMMATURE GRANULOCYTES: 0.1 10^3/UL (ref 0–0.1)
ABSOLUTE NRBC COUNT: 0 10^3/UL (ref 0–0.01)
ADD DIFF?: NO
ADD MORPH?: NO
ADD SCAN?: NO
ANION GAP SERPL CALC-SCNC: 16 MEQ/L (ref 8–16)
ATYPICAL LYMPHOCYTE FLAG: 0 (ref 0–99)
CALCIUM SERPL-MCNC: 10 MG/DL (ref 8.5–10.4)
CHLORIDE SERPL-SCNC: 101 MEQ/L (ref 97–110)
CO2 SERPL-SCNC: 23 MEQ/L (ref 22–31)
CREAT SERPL-MCNC: 1 MG/DL (ref 0.7–1.3)
ERYTHROCYTE [DISTWIDTH] IN BLOOD BY AUTOMATED COUNT: 13.2 % (ref 11.5–15.2)
ETHANOL SERPL-MCNC: 15 MG/DL (ref 0–10)
FRAGMENT RBC FLAG: 0 (ref 0–99)
GFR SERPL CREATININE-BSD FRML MDRD: > 60 ML/MIN/{1.73_M2}
GLUCOSE SERPL-MCNC: 80 MG/DL (ref 70–100)
HCT VFR BLD CALC: 42.7 % (ref 40–51)
HGB BLD-MCNC: 14.8 G/DL (ref 13.7–17.5)
LEFT SHIFT FLG: 0 (ref 0–99)
LIPEMIA HEMOLYSIS FLAG: 90 (ref 0–99)
MCH RBC BLDCO QN: 31.3 PG (ref 27.9–34.1)
MCHC RBC AUTO-ENTMCNC: 34.7 G/DL (ref 32.4–36.7)
MCV RBC AUTO: 90.3 FL (ref 81.5–99.8)
NRBC-AUTO%: 0 % (ref 0–0.2)
PLATELET # BLD: 313 10^3/UL (ref 150–400)
PLATELET CLUMPS FLAG: 10 (ref 0–99)
PMV BLD AUTO: 10.1 FL (ref 8.7–11.7)
POTASSIUM SERPL-SCNC: 4 MEQ/L (ref 3.5–5.2)
RBC # BLD AUTO: 4.73 10^6/UL (ref 4.4–6.38)
SODIUM SERPL-SCNC: 140 MEQ/L (ref 134–144)

## 2017-08-23 PROCEDURE — G0480 DRUG TEST DEF 1-7 CLASSES: HCPCS

## 2017-08-23 NOTE — EDPHY
H & P


Stated Complaint: Pt bipolar, increased manic behavior, sent by therapist for 

eval


Source: Patient


Exam Limitations: No limitations





- Personal History


Current Tetanus/Diphtheria Vaccine: Yes


Tetanus Vaccine Date: 2015





- Medical/Surgical History


Hx Asthma: No


Hx Chronic Respiratory Disease: No


Hx Diabetes: No


Hx Cardiac Disease: No


Hx Renal Disease: No


Hx Cirrhosis: No


Hx Alcoholism: No


Hx HIV/AIDS: No


Hx Splenectomy or Spleen Trauma: No


Other PMH: SEIZURES, BIPOLAR, ANXIETY, THORACIC VERT FX





- Social History


Smoking Status: Never smoked


Time Seen by Provider: 08/23/17 19:54


HPI/ROS: 





CHIEF COMPLAINT:  Delusional behavior





HISTORY OF PRESENT ILLNESS:  The patient presents to the ED with insomnia and 

recurrent delusional behavior regarding the government being after him for a 

security breech.  The patient was hospitalized here at our inpatient 

psychiatric unit approximately a month ago with decompensated bipolar mood 

disorder.  The patient reports he has been compliant with his Lamictal and 

Seroquel.  The patient reports he has not slept in the past 2 days.  The 

patient reports that he feels as if he is going to have a seizure.  The patient 

reports he has a history of epilepsy.  In reviewing his records, the patient 

has a history of intermittent delusional behavior escalating over the past 

several years.  The patient has had psychiatric hospitalization for this 

condition in the past.  The patient denies suicidal or homicidal ideation.  The 

patient denies any additional recreational drug use aside from occasional 

marijuana.





REVIEW OF SYSTEMS:


A comprehensive 10 point review of systems is otherwise negative aside from 

elements mentioned in the history of present illness. (Yoandy Radford)





- Physical Exam


Exam: 





General Appearance:  Alert, no distress


Eyes:  Pupils equal and round no pallor or injection


ENT, Mouth:  Mucous membranes moist


Respiratory:  There are no retractions, lungs are clear to auscultation


Cardiovascular:  Regular rate and rhythm


Gastrointestinal:  Abdomen is soft and nontender, no masses, bowel sounds normal


Neurological:  A&O, normal motor function, normal sensory exam, normal cranial 

nerves


Skin:  Warm and dry, no rashes


Musculoskeletal:  Neck is supple nontender


Extremities:  symmetrical, full range of motion


Psychiatric:  Alert and oriented x3, cooperative, tangential thought process, 

delusions about being pursued by government officials surrounding a computer 

security breech (Yoandy Radford)


Constitutional: 





 Initial Vital Signs











Temperature (C)  36.6 C   08/23/17 19:26


 


Heart Rate  61   08/23/17 19:26


 


Respiratory Rate  16   08/23/17 19:26


 


Blood Pressure  149/107 H  08/23/17 19:26


 


O2 Sat (%)  95   08/23/17 19:26








 











O2 Delivery Mode               Room Air














Allergies/Adverse Reactions: 


 





No Known Allergies Allergy (Verified 08/23/17 19:28)


 








Home Medications: 














 Medication  Instructions  Recorded


 


Finasteride [Propecia] 1 mg PO DAILY 01/21/17


 


QUEtiapine FUMARATE [Seroquel 200 400 mg PO HS #60 tab 07/17/17





mg (*)]  


 


QUEtiapine FUMARATE [Seroquel 25 25 mg PO BID@09,14 #60 tab 07/17/17





mg (*)]  


 


lamoTRIgine [Lamictal] 200 mg PO BID #60 tablet 07/17/17


 


Amitriptyline HCl 10 mg PO HS 08/23/17














Medical Decision Making


ED Course/Re-evaluation: 





The patient presents to the ED with slightly decompensated bipolar mood 

disorder with symptoms of acute delusions and insomnia.  The patient denies 

suicidal or homicidal ideation.  The patient has been medically cleared for 

psychiatric evaluation.





The patient will be turned over to Dr. Colby at 11:00 p.m. pending 

psychiatric evaluation. (Yoandy Radford)


6:30 a.m.-


The patient has been stable during my shift and has not required any 

medications.  He was evaluated by the mental health worker who would like to re-

evaluate him in the morning.  The patient would like to be admitted to [address] [place] [address], 

however there is some concern for medication seeking behavior.  Attempts have 

been made to contact patient's collaterals and mental health workers, however 

at this time of day, it is been difficult.  They have advised us to hold on any 

benzodiazepines for now and will re-evaluate in the morning.  Anticipate the 

case will be signed out at 7:00 a.m. to Dr. Hatfield.


 (Maryjo Colby)


Differential Diagnosis: 





Differential diagnosis considered includes psychosis, bipolar mood disorder, 

alcohol withdrawal, delirium, metabolic abnormality, suicidal ideation (Yoandy Radford)


Other Provider: 





Patient signed out to me at 0700.  At approximately 1pm, patient has been 

evaluated by mental health, who recommend the patient be placed on a hold, 

which I have signed.  They will work on placement.  Patient remained 

hemodynamically stable over the course of my shift. 





1500: Patient care signed out to Dr. Berumen at shift change pending placement.  

(Tejinder Hatfield)





I assumed care of this patient from Dr. Hatfield at 3:00 p.m..  Patient has 

been stable during my shift.  Placement has been obtained at [facility].  

I have signed the EMTALA form. (Aislinn Berumen)





- Data Points


Laboratory Results: 





 Laboratory Results





 08/23/17 19:40 





 08/23/17 19:40 








Medications Given: 





 








Discontinued Medications





Ibuprofen (Motrin)  600 mg PO EDNOW ONE


   Stop: 08/24/17 07:50


   Last Admin: 08/24/17 08:16 Dose:  600 mg


Ibuprofen (Motrin)  600 mg PO EDNOW ONE


   Stop: 08/24/17 14:27


   Last Admin: 08/24/17 14:42 Dose:  600 mg


Lamotrigine (Lamictal)  200 mg PO EDNOW ONE


   Stop: 08/24/17 07:50


   Last Admin: 08/24/17 08:15 Dose:  200 mg


Lorazepam (Ativan)  1 mg PO EDNOW ONE


   Stop: 08/23/17 19:50


   Last Admin: 08/23/17 21:00 Dose:  1 mg


Lorazepam (Ativan)  1 mg PO EDNOW ONE


   Stop: 08/24/17 14:27


   Last Admin: 08/24/17 14:43 Dose:  1 mg








Departure





- Departure


Condition: Good


Referrals: 


NONE *PRIMARY CARE P,. [Primary Care Provider] - As per Instructions

## 2017-08-24 VITALS
HEART RATE: 82 BPM | DIASTOLIC BLOOD PRESSURE: 78 MMHG | SYSTOLIC BLOOD PRESSURE: 131 MMHG | TEMPERATURE: 98.4 F | OXYGEN SATURATION: 95 %

## 2018-01-19 ENCOUNTER — HOSPITAL ENCOUNTER (INPATIENT)
Dept: HOSPITAL 80 - FED | Age: 38
LOS: 4 days | Discharge: HOME | DRG: 885 | End: 2018-01-23
Attending: SPECIALIST | Admitting: SPECIALIST
Payer: MEDICAID

## 2018-01-19 DIAGNOSIS — F12.90: ICD-10-CM

## 2018-01-19 DIAGNOSIS — F25.0: Primary | ICD-10-CM

## 2018-01-19 DIAGNOSIS — Z72.0: ICD-10-CM

## 2018-01-19 DIAGNOSIS — Z72.89: ICD-10-CM

## 2018-01-19 LAB — PLATELET # BLD: 240 10^3/UL (ref 150–400)

## 2018-01-19 PROCEDURE — G0480 DRUG TEST DEF 1-7 CLASSES: HCPCS

## 2018-01-19 NOTE — EDPHY
H & P


Stated Complaint: M1


Source: Patient, Police


Exam Limitations: No limitations





- Personal History


Current Tetanus/Diphtheria Vaccine: Unsure


Current Tetanus Diphtheria and Acellular Pertussis (TDAP): Unsure


Tetanus Vaccine Date: 2015





- Medical/Surgical History


Hx Asthma: No


Hx Chronic Respiratory Disease: No


Hx Diabetes: No


Hx Cardiac Disease: No


Hx Renal Disease: No


Hx Cirrhosis: No


Hx Alcoholism: No


Hx HIV/AIDS: No


Hx Splenectomy or Spleen Trauma: No


Other PMH: SEIZURES, BIPOLAR, ANXIETY, THORACIC VERT FX





- Social History


Smoking Status: Never smoked


Time Seen by Provider: 01/19/18 23:24


HPI/ROS: 


HPI:  This is a 37-year-old male who presents with





Chief Complaint:  M1 hold





Location:psych 


Quality:  M1 hold


Duration:  Today


Signs and Symptoms: no auditory and visual command hallucinations, no suicidal 

ideation with a plan, no homicidal ideation, no paranoia 


Timing:  Acute on chronic 


Severity:  Moderate to severe


Context:  Patient has a history of bipolar 1 disorder; mixed type presents on 

M1 hold as patient appears to be in significant state of decline.  He is not 

taking psychiatric meds per his psychiatrist.  Has made numerous vague threats 

to harm himself or other people.  Per video post on [employer] today "God's hand 

is on me, and if you are in my way, the way God's telling me to go, you will be 

destroyed."  Mother reports being afraid of patient.  Patient reports that he 

has had disagreements with his psychiatrist over the last month and he believes 

that his psychiatrist is matted him and that is why he is placed on M1 hold.  

He denies being noncompliant with his medications and even reports that he took 

his medications this morning. Patient is requesting his evening medications.  

Denies any medical problems or history.  Denies chest pain, abdominal pain, 

nausea, vomiting, fever, cough, shortness of breath. 


Modifying Factors: 





Comment: 








ROS: see HPI


Constitutional: No fever, no chills, no weight loss


Eyes:  No blurred vision


Respiratory:  No shortness of breath, no cough


Cardiovascular:  No chest pain


Gastrointestinal:  No nausea, no vomiting, no diarrhea 


Genitourinary:  No dysuria 


Extremities:  No myalgias 


Neurologic:  No weakness, no numbness


Skin:  No rashes


Hematologic:  No bruising, no bleeding





MEDICAL/SURGICAL/SOCIAL HISTORY: 


Medical history:  Generally healthy.  Does not take any regular medications.


Surgical history:  Denies


Social history:  











CONSTITUTIONAL: awake and alert, no obvious distress


HEENT: Atraumatic and normocephalic, PERRL, EOMI. Tympanic membranes clear. 

Oropharynx clear, no exudate and moist pink mucosa.  Airway patent.  No 

lymphadenopathy.  No meningismus.


Cardiovascular: Normal S1/S2, regular rate, regular rhythm, without murmur rub 

or gallop.


PULMONARY/CHEST:  Symmetrical and nontender. Clear to auscultation bilaterally. 

Good air movement. No accessory muscle usage.


ABDOMEN:  Soft, nondistended, nontender, no rebound, no guarding, no peritoneal 

signs, no masses or organomegaly. No CVAT.


EXTREMITIES:  2/2 pulses, strength 5/5, no deformities, no clubbing, no 

cyanosis or edema.


NEUROLOGICAL: no focal neuro deficits.  GCS 15.


SKIN: Warm and dry, no erythema. no rash.  Good capillary refill. 


PSYCH:  Good eye contact, no flight of ideas, organized thought process, 

relatively good insight and judgment, no auditory and visual command 

hallucinations, no suicidal ideation with a plan, no homicidal ideation, not 

paranoid 





 (Ganesh,Terra)


Constitutional: 


 Initial Vital Signs











Temperature (C)  36.9 C   01/19/18 22:55


 


Heart Rate  104 H  01/19/18 22:55


 


Respiratory Rate  16   01/19/18 22:55


 


Blood Pressure  146/94 H  01/19/18 22:55


 


O2 Sat (%)  95   01/19/18 22:55








 











O2 Delivery Mode               Room Air














Allergies/Adverse Reactions: 


 





No Known Allergies Allergy (Verified 08/23/17 19:28)


 








Home Medications: 














 Medication  Instructions  Recorded


 


Escitalopram Oxalate [Lexapro] 10 mg PO HS 01/20/18


 


Lithium Carbonate ER [Lithobid 300 1,200 mg PO HS 01/20/18





mg (*)]  


 


QUEtiapine FUMARATE [Seroquel 200 600 mg PO HS 01/20/18





mg (*)]  


 


clonAZEPAM [Klonopin] 1 mg PO BID@09,14 01/20/18


 


lamoTRIgine [Lamictal] 150 mg PO BID 01/20/18














Medical Decision Making


ED Course/Re-evaluation: 


Placed on M1 hold by [facility] upon arrival.  Labs and UDS ordered.  

Patient is currently calm and cooperative in no interventions are required.  I 

have asked the nurse to get his medication list that can order his evening meds 

per his request. 


2349:  Serum ethanol 67; labs reviewed and grossly unremarkable. 


0220:  Still waiting on urine.  reassessed patient who is sleeping soundly.  

End of shift.  Signed out to Dr. Luveano pending mental health evaluation and 

final disposition. 








This patient was seen under the supervision of my secondary supervising 

physician. The patient was seen in conjunction with Dr. Luevano, who saw and 

evaluated the patient. Patient's presentation, labs/imaging, treatment and plan 

of care were discussed with secondary supervising physician. 


 (Kina Andersen)


Differential Diagnosis: 


Differential diagnosis includes but is not limited to valentina, psychosis, paranoia

, gravely disabled. 


 (Kina Andersen)


Other Provider: 





Patient has been accepted for transfer to . (Tejinder Hatfield)





0200  care assumed by me from RADHA Andersen pending mental health evaluation.





0700  care transferred to Dr. Hatfield pending evaluation.  No issues during my 

care this patient overnight. (Marcio Luevano)





- Data Points


Laboratory Results: 


 Laboratory Results





 01/19/18 23:16 





 01/19/18 23:16 








Medications Given: 


 





Lithium Carbonate (Lithobid)  600 mg PO HS MARCOS


   Stop: 07/19/18 20:59


   Last Admin: 01/20/18 20:21 Dose:  600 mg


Lorazepam (Ativan)  1 mg PO Q4 PRN


   PRN Reason: ANXIETY


   Stop: 07/19/18 16:15


   Last Admin: 01/20/18 20:23 Dose:  1 mg


Olanzapine (Olanzapine)  10 mg PO HS MARCOS


   Stop: 07/19/18 20:59


   Last Admin: 01/20/18 20:22 Dose:  10 mg


Quetiapine Fumarate (Seroquel)  200 mg PO HS MARCOS


   Stop: 07/19/18 20:59


   Last Admin: 01/20/18 20:21 Dose:  200 mg





Discontinued Medications





Clonazepam (Klonopin)  1 mg PO EDNOW ONE


   Stop: 01/20/18 05:08


   Last Admin: 01/20/18 05:14 Dose:  1 mg


Lamotrigine (Lamictal)  150 mg PO EDNOW ONE


   Stop: 01/20/18 05:08


   Last Admin: 01/20/18 05:45 Dose:  150 mg








Departure





- Departure


Disposition: [facility] IP


Clinical Impression: 


 Bipolar 1 disorder, Noncompliance with medication regimen, Acute psychosis





Condition: Fair

## 2018-01-21 RX ADMIN — Medication SCH MG: at 15:03

## 2018-01-21 RX ADMIN — LITHIUM CARBONATE SCH MG: 300 TABLET, EXTENDED RELEASE ORAL at 20:36

## 2018-01-21 NOTE — BAPA
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 not necessarily take it for psychosis.  



The one thing that has been consistent for this patient during his encounters 
with the staff on [address] and as well as noted by his mother and by his 
outpatient providers, is that he has persecutory delusions that do not seem to 
ever remit.  That he believes that he is being monitored and followed by the 
[facility].  It is unclear whether these persecutory delusions represent a delusional 
disorder in addition to bipolar disorder, or they represent a component of 
schizoaffective disorder, but given the fact that he has been on multiple 
trials of antipsychotics including Zyprexa which he took in the past, Risperdal
, and now Seroquel, either these medications are not effective or not 
prescribed at effective doses or his delusions are not amenable to 2nd 
generational atypicals.  Then this may be something that needs to be further 
explored by his outpatient psychiatrist, either a trial of a more therapeutic 
dose of Seroquel for psychosis or switching him to a different type of 
antipsychotic medication.  So, this is something that will be considered during 
this hospitalization and consultation with his outpatient provider, Dr. Rocha.  So, those are the medication recommendations.  



4.  The patient will engage in individual, group, and milieu therapies.

5.  The patient will be discharged back to the care of his outpatient providers 
through [facility].

6.  Patient's mental health hold expires tomorrow.  MD did discuss with him 
what the expectations would be in the hospital.  So far, he does not present 
acutely manic.  He does have paranoid delusions, but he seems to be at his 
baseline in terms of the severity of his persecutory delusions.  They do not 
seem to be any worse or better than they were when he was here in July and we 
will need to collect more collateral information to determine the degree to 
which the persecutory delusions are having an adverse impact on his ability to 
function.  That determination will be made after more collateral information 
and direct observation of the patient while he is on [facility].





Job #:  [id number]

MTDD

## 2018-01-21 NOTE — GCON
[f 
rep st]



                                                                    CONSULTATION





DATE OF CONSULTATION:  01/21/2018



REASON FOR CONSULTATION:  I was asked by Dr. Kohli to see the patient in regard 
to his medical issues.



HISTORY OF PRESENT ILLNESS:  This is a 37-year-old man, who was admitted to 
behavioral health yesterday afternoon.  He tells me that he had been taking his 
medications appropriately.  He also tells me that there had been some confusion 
as to whether or not he had taken a master set of keys from his apartment 
complex.  He was walking his dog when he was taken into custody by police and 
placed on a M1 hold.  However, emergency room note somewhat discordant with 
this.  There is some concern that from a twitter post, that he may want to 
injure other people.  This note also reports that his mother was afraid of him.
  His emergency room course was relatively unremarkable, per chart review.  He 
did receive Ativan, Zyprexa, Seroquel, and lithium while in the emergency 
department.  He complains of some bilateral numbness in his first 3 fingers.  
This has been persistent for some time.



PAST MEDICAL/SURGICAL HISTORY:  

1.  Seizure disorder.  Currently on Lamictal and Klonopin, last seizure 7 
months ago.

2.  Bipolar disorder, 

3.  Multiple orthopedic surgeries.

4.  Sinus surgery.



MEDICATIONS:  Please see medication reconciliation.



ALLERGIES:  No known drug allergies.



FAMILY HISTORY:  No bipolar.



SOCIAL HISTORY:  He is currently smoking, he has only been smoking about 6 or 7 
months, however, he does not feel as though he is in withdrawal, occasional 
alcohol use, which he has cut down over the last 7 months.



REVIEW OF SYSTEMS:  A 10-point review of systems is conducted and is negative, 
except per HPI.



PHYSICAL EXAM:  VITAL SIGNS:  Blood pressure 113/65, heart rate 67, respiration 
rate 16, saturating at 95% on room air.  Temperature is 36.3.  GENERAL:  The 
patient 

is a pleasant man, who is resting comfortably, cooperative and conversant, in 
no acute distress.  HEENT:  Shows him to be normocephalic, he is anicteric.  No 
pharyngeal erythema.  CARDIOVASCULAR:  Regular rate and rhythm with an 
inspiratory split S2.  He has no other murmurs or rubs.  PULMONARY:  Shows him 
to be in no respiratory distress.  He is breathing comfortably.  LUNGS:  Clear 
to auscultation bilaterally.  ABDOMEN:  Shows normal bowel sounds.  He is soft, 
nontender, nondistended.  There is no hepatosplenomegaly.  SKIN: 

Shows no rash.  :  Shows no Rodriguez.  NEUROLOGIC:  Shows him to be alert and 
oriented x3.  He is moving all extremities.  PSYCHIATRIC:  Shows normal mood 
and affect.



LABS:  White count is 13.4, bicarb is 18, creatinine is 0.9, glucose 102, 
lithium 0.4, alcohol is 67.  His tox screen is otherwise negative.



DATA:  

1.  I reviewed his chart, including emergency department note.

2.  I reviewed his head CT from last July, this was read as normal.



IMPRESSION AND PLAN:  

1.  Bipolar disorder, reported acute valentina:  Defer to Psychiatry and management 
of this.  I note that his lithium level is low.

2.  Leukocytosis:  No other signs of infection.  I think this is likely stress 
related.  Would not workup further at this time.

3.  Mildly elevated anion gap:  Do not suspect any significant pathology with 
this.  May be related to alcohol use.  Would not workup further.

4.  Seizure disorder:  Seems to be stable.  Would continue his outpatient 
regimen, including Lamictal 150 b.i.d., as well as his Klonopin.

5.  Tobacco use:  I encouraged cessation.  I do not think he needs a nicotine 
patch at this time, but this would be a consideration.

6.  Bilateral finger numbness:  Suspect that this is more related to 
hyperventilation or stress.  Very unlikely primary neurologic given the 
distribution and the bilateral aspect of it.

7.  Appropriate for care on Behavioral Health at this point.  



Thank you for involving Hospital Medicine in the care of the patient.  We will 
sign off.  Please re-consult if you need any additional assistance.





Job #:  [id number]

MTDD

## 2018-01-22 RX ADMIN — FOLIC ACID SCH MG: 1 TABLET ORAL at 08:21

## 2018-01-22 RX ADMIN — THERA TABS SCH EACH: TAB at 08:21

## 2018-01-22 RX ADMIN — LITHIUM CARBONATE SCH MG: 300 TABLET, EXTENDED RELEASE ORAL at 20:51

## 2018-01-22 RX ADMIN — Medication SCH MG: at 08:22

## 2018-01-22 NOTE — SOAPPROG
SOAP Progress Note


Assessment/Plan: 


Assessment:


























Plan:





01/22/18 18:48


Schizophrenia:  Delusions unchanged from previous experience.  I question the 

acuity of this, but that is irrelevant due to level of concern voiced from BPD.

  Pt is clearly at risk due to perception that he is dangerous and may be 

engaging in parasuicidal behaviors.  Will continue outpatient meds as prescribed

, monitor behaviors while in hospital and coordinate care with outpt team.  

Will allow pt to sign in voluntarily to promote his autonomy and the 

therapeutic alliance though I have clearly explained to him that he cannot 

leave until all involved agree he is ready and there is an adequate plan in 

place to protect him.  Pt agrees to this stating, "I can live with day to day 

decisions."


Subjective: 





Pt seen, discussed with staff, chart reviewed, case reviewed with Dr. Kohli and 

his Edge worker Char, old records reviewed.  Pt is a 38 y/o CM known to me 

peripherally from previous hospitalization here in August.  He was BIB PD due 

to threatening messages and paranoid behaviors.  I interviewed pt with his 

mother present.  We discussed the events preceding his admission and he voices 

familiar delusional thoughts about the federal government and the [place] [employer] 

persecuting him and wanting to kill him.  Char reports some concern that 

patient may be suicidal and is trying to induce the PD to kill him.  This was 

not mentioned in my interview by pt or his mother.  Pt spends most of interview 

talking about his persecutory thoughts.  Agreeable to staying in hospital 

voluntarily until we are able to coordinate an outpatient plan with [facility]'s and 

[facility].  He is adamant that he did not stop his meds.  He asks me to check his 

lithium level drawn on admission (0.4) and his pharmacy to confirm his 

compliance.


Objective: 





 Vital Signs











Temp Pulse Resp BP Pulse Ox


 


 38.1 C   84   12   113/77   95 


 


 01/22/18 17:00  01/22/18 08:54  01/22/18 17:00  01/22/18 17:00  01/22/18 17:00














- Time Spent With Patient


Time Spent With Patient: 





35"





ICD10 Worksheet


Patient Problems: 


 Problems











Problem Status Onset


 


Acute psychosis Acute  


 


Bipolar 1 disorder Acute  


 


Noncompliance with medication regimen Acute  


 


Seizure Acute  


 


Severe manic bipolar 1 disorder with psychotic behavior Acute

## 2018-01-23 VITALS
OXYGEN SATURATION: 97 % | RESPIRATION RATE: 16 BRPM | DIASTOLIC BLOOD PRESSURE: 71 MMHG | SYSTOLIC BLOOD PRESSURE: 102 MMHG | TEMPERATURE: 97.6 F | HEART RATE: 69 BPM

## 2018-01-23 RX ADMIN — FOLIC ACID SCH MG: 1 TABLET ORAL at 08:35

## 2018-01-23 RX ADMIN — Medication SCH MG: at 08:35

## 2018-01-23 RX ADMIN — THERA TABS SCH EACH: TAB at 08:35

## 2018-01-23 NOTE — BDS
[f rep st]



                                                  BEHAVIORAL HEALTH DISCHARGE SUMMARY





REASON FOR ADMISSION:  Patient is a 37-year-old  male with a bipolar disorder and chronic ps
ychosis with the consideration of possible schizoaffective disorder.  He is managed through [facility] and lives in an apartment in town by himself.  He had become more paranoid rece
ntly, and his chronic delusions that the government and police are after him had gotten worse.  He be
juan alberto calling the nonemergent police phone number in [place] as well as in [place] numerous times, t
elling them he was unsafe.  He then went into the walk-in clinic, was telling those people about matt dueñas a messenger of God and had made some vague statements about "destroying anyone working against him.
"  He also posted a note on the door of his apartment stating that anyone who came into the apartment
 would be in violation of the law and did not belong there, and reportedly left an empty shell casing
 in the window where it could be seen.  The patient's landlord took this as threatening, notified the
 police and Edge worker, and when they arrived to the apartment, the patient did not answer the door.
  This happened numerous times until they decided to do a plain clothes stake-out of his apartment, i
n which they were able to apprehend him without a struggle.  He was then brought to the emergency dep
artment for evaluation, placed on an M1 hold and admitted for further evaluation and treatment.  A fu
ll description of the events preceding admission can be found in his admission history dated 01/21/20
18, from Dr. Brian Kohli.



ADMITTING DIAGNOSES:  Per Dr. Kohli, bipolar disorder type 1, most recent episode mixed with psychotic
 features.  Rule out schizoaffective disorder.  Alcohol use disorder, severe.  Cannabis use disorder,
 severe.  Psychosocial stressors including lack of employment, conflict with his landlord, possible e
viction, financial problems, tension and conflict with his mom, and limited social support.



ADMISSION LABORATORY:  CBC showed a white count slightly up at 13.48 with no left shift.  Serum chemi
stries showed carbon dioxide low at 19, anion gap high at 18, nonfasting glucose up at 102, otherwise
 normal.  Urine drug screen was negative for all substances.  Lithium was 0.4, and alcohol level was 
67.



LABS AND TESTS:  No labs or tests were pending at the time of discharge.



HOSPITAL COURSE:  Patient was admitted to the behavior health services inpatient unit on an M1 hold. 
 He was initially describing his paranoid thoughts, stating that he was afraid the government was aft
er him, the [place] Police were trying to harass or harm him, and he was very active in talking about
 this.  I reviewed this with him and his mother on my initial interview, and then discussed the case 
with the patient's Edge worker, Char and his psychiatrist, Dr. Rocha.  In the process of this, they
 voiced concern for the patient's overall deteriorating course and the severity of his delusions, and
 some concern for safety.  There was mentioned the risks posed by the frequent police contacts and th
e fact that the police had to essentially stake out his apartment.  At no time had the patient actual
ly directly threatened the police or anyone else, and the threats that they referred to were either i
mplied or assumed by the fact the patient was paranoid and some of the odd writings that he had been 
doing.  



At any rate, on the day of discharge, I was able to speak with Dr. Rocha who stated that in her opi
nion, the patient appeared close to baseline, which I agree with, and we reviewed the case in our sta
ff meeting.  The staff also stated that he was better at this moment than he was when he discharged f
rom the hospital 6 months ago.  He was able to put aside the delusions for periods of time and when I
 talked with him on both occasions over the 48 hours I was working with him, he would start into his 
delusions and then stop himself and say something like, "I'm not going to worry about that right now.
"  When asked what he was going to do to have fewer police contacts, he stated that he would like to 
have a liaison within [facility] so that he could call if he was feeling afraid o
r threatened, instead of calling the police.  I thought this was excellent thinking and we were able 
to discuss who that person might be.  I discussed this with Dr. Rocha also, and she had some sugges
tions.  They had mentioned assigning him a , possibly a  who he could call 
if he was feeling unsafe. 



The patient's hospitalization was uncomplicated.  He was able to have a service dog, which pleased partha hurley, and he was cooperative.  He was minimally invested in groups, but did attend.  He was compliant wi
th all medications.  He insisted that he had been compliant with medications prior to admission and h
jessica did have a lithium level of 0.4, indicating at least that he was taking some amount.  At no time di
d he appear manic and as mentioned above, his delusions were actually at a lower level than I had see
n them previously.  It is unclear to me if perhaps the alcohol use at time of admission may have cont
ributed in some way to his more disinhibited behaviors, and he was pretty evasive about his overall a
mount of alcohol use recently.  I counseled him strongly about abstaining from substances, and he see
med to hear this.  It may be a good idea for that to be part of his outpatient recovery plan.  



Otherwise, the patient was signed involuntarily on 01/22/2018, and requested discharge on 01/23/2018,
 to return to his apartment.  I felt like he did not meet criteria for placement of another M1 hold a
nd though we did offer him voluntary hospitalization to continue planning and stress management activ
ities, he declined.



CONDITION ON DISCHARGE:  Stable.  His affect was euthymic, stable and appropriate.  He was calm and c
ooperative, sitting in the day room, watching television with his dog.  He was interacting well with 
staff and other patients, and did not appear to be paranoid.  At no time during his hospitalization d
id he display any aggression.  At the time of discharge, he adamantly denied any thoughts of suicide,
 homicide or violence.



DISCHARGE MEDICATIONS:  Lithium carbonate ER 1200 mg at h.s., clonazepam 1 mg twice daily at 0900 and
 1400, Lexapro 10 mg at h.s., Lamictal 150 mg twice daily, and Seroquel 600 at h.s.



DISCHARGE DIAGNOSES:  

1.  Schizoaffective disorder, bipolar type, chronic with acute exacerbation.  

2.  Cannabis use disorder, severity unknown.  

3.  Alcohol use disorder, severity unknown.  

4.  Chronic illness, family conflict, possible pending eviction.



DISPOSITION:  Patient left the hospital to return to his apartment.



FOLLOWUP:  Patient will follow up with his current outpatient providers next week at [facility].



LEGAL COURSE:  The patient was converted to a voluntary status at the expiration of his M1 hold.





Job #:  [id number]

## 2023-09-26 NOTE — EDPHY
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 cause All men in this age group Every visit   [address] [place] [address] [facility]  Date Last Reviewed: 2/1/2017  © 1621-4384 [employer], [employer]. [address] [place] [address], [place] [address]. All rights reserved. This information is not intended as a substitute for professional medical care. Always follow your healthcare professional's instructions.                      Jay Blair CNP            Schedule follow up: in a year, at next annual exam